# Patient Record
Sex: FEMALE | Race: BLACK OR AFRICAN AMERICAN | NOT HISPANIC OR LATINO | Employment: UNEMPLOYED | URBAN - METROPOLITAN AREA
[De-identification: names, ages, dates, MRNs, and addresses within clinical notes are randomized per-mention and may not be internally consistent; named-entity substitution may affect disease eponyms.]

---

## 2023-07-12 LAB
CHLAMYDIA TRACH., AMPLIFIED: NEGATIVE
N. GONORRHEA, AMPLIFIED: NEGATIVE
TRICHOMONAS VAGINALIS: NEGATIVE

## 2024-02-26 ENCOUNTER — HOSPITAL ENCOUNTER (OUTPATIENT)
Dept: RADIOLOGY | Facility: CLINIC | Age: 44
Discharge: HOME | End: 2024-02-26
Payer: MEDICAID

## 2024-02-26 VITALS — HEIGHT: 60 IN | WEIGHT: 184.08 LBS | BODY MASS INDEX: 36.14 KG/M2

## 2024-02-26 DIAGNOSIS — Z12.31 ENCOUNTER FOR SCREENING MAMMOGRAM FOR MALIGNANT NEOPLASM OF BREAST: ICD-10-CM

## 2024-02-26 PROCEDURE — 77067 SCR MAMMO BI INCL CAD: CPT | Mod: BILATERAL PROCEDURE | Performed by: STUDENT IN AN ORGANIZED HEALTH CARE EDUCATION/TRAINING PROGRAM

## 2024-02-26 PROCEDURE — 77063 BREAST TOMOSYNTHESIS BI: CPT | Mod: BILATERAL PROCEDURE | Performed by: STUDENT IN AN ORGANIZED HEALTH CARE EDUCATION/TRAINING PROGRAM

## 2024-02-26 PROCEDURE — 77067 SCR MAMMO BI INCL CAD: CPT

## 2024-02-28 ENCOUNTER — TELEPHONE (OUTPATIENT)
Dept: OBSTETRICS AND GYNECOLOGY | Facility: CLINIC | Age: 44
End: 2024-02-28
Payer: MEDICAID

## 2024-02-28 NOTE — TELEPHONE ENCOUNTER
Called patient to discuss mammogram results. Patient informed she needs to complete diagnostic and ultrasound testing. Patient stated she would call to schedule appointment. Patient did not express any concerns at this time. Patient informed to call office if she has any questions.    ----- Message from Kimberley Nichols MD sent at 2/27/2024  7:17 PM EST -----  Please call patient about her recent mammogram. She was due for a diagnostic mammogram and ultrasound but declines at that time and opted for a screening mammogram. Her evaluation is incomplete until she has a diagnostic mammogram with more imaging and an ultrasound. The orders from 2023 are in the system and are still good so she should call to schedule. Please let me know if she has specific concerns about having the evaluation done that I can help address. thanks

## 2024-03-08 ENCOUNTER — APPOINTMENT (OUTPATIENT)
Dept: RADIOLOGY | Facility: HOSPITAL | Age: 44
End: 2024-03-08
Payer: MEDICAID

## 2024-03-15 ENCOUNTER — HOSPITAL ENCOUNTER (OUTPATIENT)
Dept: RADIOLOGY | Facility: HOSPITAL | Age: 44
Discharge: HOME | End: 2024-03-15
Payer: MEDICAID

## 2024-03-15 DIAGNOSIS — N63.0 UNSPECIFIED LUMP IN UNSPECIFIED BREAST: ICD-10-CM

## 2024-03-15 DIAGNOSIS — R92.8 ABNORMAL SCREENING MAMMOGRAM: ICD-10-CM

## 2024-03-15 PROCEDURE — 77061 BREAST TOMOSYNTHESIS UNI: CPT | Mod: LEFT SIDE | Performed by: RADIOLOGY

## 2024-03-15 PROCEDURE — 76642 ULTRASOUND BREAST LIMITED: CPT | Mod: LEFT SIDE | Performed by: RADIOLOGY

## 2024-03-15 PROCEDURE — 77061 BREAST TOMOSYNTHESIS UNI: CPT | Mod: LT

## 2024-03-15 PROCEDURE — 76982 USE 1ST TARGET LESION: CPT | Mod: LT

## 2024-03-15 PROCEDURE — 77065 DX MAMMO INCL CAD UNI: CPT | Mod: LEFT SIDE | Performed by: RADIOLOGY

## 2024-03-15 PROCEDURE — 76642 ULTRASOUND BREAST LIMITED: CPT | Mod: LT

## 2024-04-08 ENCOUNTER — OFFICE VISIT (OUTPATIENT)
Dept: OBSTETRICS AND GYNECOLOGY | Facility: CLINIC | Age: 44
End: 2024-04-08
Payer: MEDICAID

## 2024-04-08 ENCOUNTER — LAB (OUTPATIENT)
Dept: LAB | Facility: LAB | Age: 44
End: 2024-04-08
Payer: MEDICAID

## 2024-04-08 VITALS
HEIGHT: 60 IN | SYSTOLIC BLOOD PRESSURE: 118 MMHG | BODY MASS INDEX: 35.93 KG/M2 | DIASTOLIC BLOOD PRESSURE: 76 MMHG | WEIGHT: 183 LBS

## 2024-04-08 DIAGNOSIS — A53.0 SYPHILIS, LATENT: ICD-10-CM

## 2024-04-08 DIAGNOSIS — Z12.4 CERVICAL CANCER SCREENING: ICD-10-CM

## 2024-04-08 DIAGNOSIS — N93.9 ABNORMAL UTERINE BLEEDING (AUB): ICD-10-CM

## 2024-04-08 DIAGNOSIS — Z12.4 PAP SMEAR FOR CERVICAL CANCER SCREENING: ICD-10-CM

## 2024-04-08 LAB — TREPONEMA PALLIDUM IGG+IGM AB [PRESENCE] IN SERUM OR PLASMA BY IMMUNOASSAY: REACTIVE

## 2024-04-08 PROCEDURE — 99396 PREV VISIT EST AGE 40-64: CPT | Performed by: OBSTETRICS & GYNECOLOGY

## 2024-04-08 PROCEDURE — 88142 CYTOPATH C/V THIN LAYER: CPT

## 2024-04-08 PROCEDURE — 87800 DETECT AGNT MULT DNA DIREC: CPT

## 2024-04-08 PROCEDURE — 87661 TRICHOMONAS VAGINALIS AMPLIF: CPT

## 2024-04-08 PROCEDURE — 88141 CYTOPATH C/V INTERPRET: CPT | Performed by: STUDENT IN AN ORGANIZED HEALTH CARE EDUCATION/TRAINING PROGRAM

## 2024-04-08 PROCEDURE — 86780 TREPONEMA PALLIDUM: CPT

## 2024-04-08 PROCEDURE — 87624 HPV HI-RISK TYP POOLED RSLT: CPT

## 2024-04-08 PROCEDURE — 36415 COLL VENOUS BLD VENIPUNCTURE: CPT

## 2024-04-08 PROCEDURE — 86318 IA INFECTIOUS AGENT ANTIBODY: CPT

## 2024-04-08 PROCEDURE — 87491 CHLMYD TRACH DNA AMP PROBE: CPT

## 2024-04-08 PROCEDURE — 87591 N.GONORRHOEAE DNA AMP PROB: CPT

## 2024-04-08 NOTE — PROGRESS NOTES
Assessment     PLAN  1. Pap smear for cervical cancer screening  - THINPREP PAP TEST (>30)  - HPV DNA High Risk With Genotype  - C. Trachomatis / N. Gonorrhoeae, Amplified Detection  - Trichomonas vaginalis, Nucleic Acid Detection    2. Syphilis  - treated for late ;latent syphilis with 3 doses of penicillin due to unknown timing of infection. Diagnosed in 2/2023, need 12 month titer today  - Syphilis Screen with Reflex; Future    3. Abnormal uterine bleeding (AUB)  - recommend pelvic ultrasound for persistent breakthrough bleeding with mirena IUD in place  - US PELVIS TRANSABDOMINAL WITH TRANSVAGINAL; Future    Please return for your next visit in 1 year or sooner as needed.    Kimberley Nichols MD      Subjective     Vicky Wills is a 43 y.o. female who is here for a routine exam.   PCP = Mady Jonas, LUIS FERNANDO-CNP    APE Concerns:     Gynecologic History:    Menses: irregular, heavy at times, not improved with mirena IUD  Last Pap: 2/2023: ASCUS/HR HPV+, colpo 3/2023 JEVON 1, repeat pap today  STI testing: accepts GCT from pap  Contraception: mirena IUD inserted in 12/2023  Mammogram: BIRADs 3 in 3/2024, need mammogram and final left breast ultrasound in 3/2025      PMH, PSH, FH, SH, medications and allergies reviewed and edited as needed.      Objective   /76   Ht 1.524 m (5')   Wt 83 kg (183 lb)   LMP 03/08/2024 Comment: Then heavy spptting 22-224th  BMI 35.74 kg/m²      General:   Alert and oriented, in no acute distress   Neck: Supple. No visible thyromegaly.    Breast/Axilla: Normal to palpation bilaterally without masses, skin changes, or nipple discharge.    Abdomen: Soft, non-tender, without masses or organomegaly   Vulva: Normal architecture without erythema, masses, or lesions.    Vagina: Normal mucosa without lesions, masses, or atrophy. No abnormal vaginal discharge.    Cervix: Normal without masses, lesions, or signs of cervicitis. Strings visible   Uterus: Normal mobile,  non-enlarged uterus    Adnexa: Normal without masses or lesions   Pelvic Floor No POP noted. No high tone pelvic floor   Psych Normal affect. Normal mood.

## 2024-04-09 LAB — RPR SER QL: NONREACTIVE

## 2024-04-10 ENCOUNTER — APPOINTMENT (OUTPATIENT)
Dept: RADIOLOGY | Facility: HOSPITAL | Age: 44
End: 2024-04-10
Payer: MEDICAID

## 2024-04-10 LAB
C TRACH RRNA SPEC QL NAA+PROBE: NEGATIVE
N GONORRHOEA DNA SPEC QL PROBE+SIG AMP: NEGATIVE
T PALLIDUM AB SER QL AGGL: REACTIVE
T VAGINALIS RRNA SPEC QL NAA+PROBE: NEGATIVE

## 2024-04-12 ENCOUNTER — HOSPITAL ENCOUNTER (OUTPATIENT)
Dept: RADIOLOGY | Facility: HOSPITAL | Age: 44
Discharge: HOME | End: 2024-04-12
Payer: MEDICAID

## 2024-04-12 DIAGNOSIS — N93.9 ABNORMAL UTERINE BLEEDING (AUB): ICD-10-CM

## 2024-04-12 PROCEDURE — 76856 US EXAM PELVIC COMPLETE: CPT

## 2024-04-12 PROCEDURE — 76830 TRANSVAGINAL US NON-OB: CPT | Performed by: RADIOLOGY

## 2024-04-12 PROCEDURE — 76856 US EXAM PELVIC COMPLETE: CPT | Performed by: RADIOLOGY

## 2024-04-15 ENCOUNTER — TELEPHONE (OUTPATIENT)
Dept: OPERATING ROOM | Facility: CLINIC | Age: 44
End: 2024-04-15
Payer: MEDICAID

## 2024-04-15 DIAGNOSIS — N83.9 LESION OF RIGHT OVARY: Primary | ICD-10-CM

## 2024-04-15 NOTE — TELEPHONE ENCOUNTER
Called patient to discuss test results of pelvic ultrasound showing a possible dermoid cyst of right ovary. Recommend follow up pelvic MRI. Order placed.

## 2024-04-23 LAB
CYTOLOGY CMNT CVX/VAG CYTO-IMP: NORMAL
HPV HR 12 DNA GENITAL QL NAA+PROBE: POSITIVE
HPV HR GENOTYPES PNL CVX NAA+PROBE: POSITIVE
HPV16 DNA SPEC QL NAA+PROBE: NEGATIVE
HPV18 DNA SPEC QL NAA+PROBE: NEGATIVE
LAB AP HPV GENOTYPE QUESTION: YES
LAB AP HPV HR: NORMAL
LAB AP PAP ADDITIONAL TESTS: NORMAL
LABORATORY COMMENT REPORT: NORMAL
LABORATORY COMMENT REPORT: NORMAL
LMP START DATE: NORMAL
PATH REPORT.TOTAL CANCER: NORMAL

## 2024-04-24 ENCOUNTER — TELEPHONE (OUTPATIENT)
Dept: OBSTETRICS AND GYNECOLOGY | Facility: CLINIC | Age: 44
End: 2024-04-24
Payer: MEDICAID

## 2024-04-24 NOTE — TELEPHONE ENCOUNTER
4/24/2024 Called patient. Patient informed of pap results. Reviewed colposcopy procedure w/ patient. Patient is scheduled for an appointment may 16th. Patient informed if she does start her period to call to get appointment rescheduled. Patient has no questions or concerns at this time    ----- Message from Kimberley Nichols MD sent at 4/24/2024 12:48 AM EDT -----  Please let patient know that her pap was ASCUS/high risk HPV positive so we need to get her scheduled for a colposcopy. thanks

## 2024-04-27 ENCOUNTER — HOSPITAL ENCOUNTER (OUTPATIENT)
Dept: RADIOLOGY | Facility: HOSPITAL | Age: 44
Discharge: HOME | End: 2024-04-27
Payer: MEDICAID

## 2024-04-27 DIAGNOSIS — N83.9 LESION OF RIGHT OVARY: ICD-10-CM

## 2024-04-27 PROCEDURE — 72197 MRI PELVIS W/O & W/DYE: CPT

## 2024-04-27 PROCEDURE — A9575 INJ GADOTERATE MEGLUMI 0.1ML: HCPCS | Performed by: OBSTETRICS & GYNECOLOGY

## 2024-04-27 PROCEDURE — 2550000001 HC RX 255 CONTRASTS: Performed by: OBSTETRICS & GYNECOLOGY

## 2024-04-27 PROCEDURE — 72197 MRI PELVIS W/O & W/DYE: CPT | Performed by: STUDENT IN AN ORGANIZED HEALTH CARE EDUCATION/TRAINING PROGRAM

## 2024-04-27 RX ORDER — GADOTERATE MEGLUMINE 376.9 MG/ML
17 INJECTION INTRAVENOUS
Status: COMPLETED | OUTPATIENT
Start: 2024-04-27 | End: 2024-04-27

## 2024-04-27 RX ADMIN — GADOTERATE MEGLUMINE 17 ML: 376.9 INJECTION INTRAVENOUS at 10:48

## 2024-04-30 ENCOUNTER — APPOINTMENT (OUTPATIENT)
Dept: RADIOLOGY | Facility: CLINIC | Age: 44
End: 2024-04-30
Payer: MEDICAID

## 2024-05-16 ENCOUNTER — APPOINTMENT (OUTPATIENT)
Dept: OBSTETRICS AND GYNECOLOGY | Facility: CLINIC | Age: 44
End: 2024-05-16
Payer: MEDICAID

## 2024-06-13 ENCOUNTER — APPOINTMENT (OUTPATIENT)
Dept: OBSTETRICS AND GYNECOLOGY | Facility: CLINIC | Age: 44
End: 2024-06-13
Payer: MEDICAID

## 2024-06-13 VITALS
WEIGHT: 188 LBS | DIASTOLIC BLOOD PRESSURE: 78 MMHG | BODY MASS INDEX: 36.91 KG/M2 | HEIGHT: 60 IN | SYSTOLIC BLOOD PRESSURE: 124 MMHG

## 2024-06-13 DIAGNOSIS — A53.0 SYPHILIS, LATENT: ICD-10-CM

## 2024-06-13 DIAGNOSIS — R87.610 ASCUS WITH POSITIVE HIGH RISK HPV CERVICAL: Primary | ICD-10-CM

## 2024-06-13 DIAGNOSIS — N93.9 ABNORMAL UTERINE BLEEDING (AUB): ICD-10-CM

## 2024-06-13 DIAGNOSIS — D27.0 DERMOID CYST OF RIGHT OVARY: ICD-10-CM

## 2024-06-13 DIAGNOSIS — R87.810 ASCUS WITH POSITIVE HIGH RISK HPV CERVICAL: Primary | ICD-10-CM

## 2024-06-13 PROCEDURE — 99213 OFFICE O/P EST LOW 20 MIN: CPT | Performed by: OBSTETRICS & GYNECOLOGY

## 2024-06-13 ASSESSMENT — PAIN SCALES - GENERAL: PAINLEVEL: 0-NO PAIN

## 2024-06-13 NOTE — PROGRESS NOTES
Assessment     PLAN  1. ASCUS with positive high risk HPV cervical  - colposcopy performed today with ECC and one cervical biopsy. See procedure note.  - Surgical Pathology Exam    2. Dermoid cyst of right ovary  - 5cm in size, minimal abdominal discomfort, discussed option for monitoring with imaging vs surgical removal. Reviewed R/B of each option. She desires laparoscopic right ovarian cystectomy. Case request will be placed with pre-op labs.    3. Syphilis, latent  - 12 month titers were non reactive. Needs repeat in 1 year, ~4/2025    4. Abnormal uterine bleeding (AUB)  - mirena IUD in placed. Menses are light but prolonged. Discussed alternative options including other medical management options or surgical options including hysterectomy for definitive management. She desires to monitor for now.     Kimberley Nichols MD      Subjective     Vicky Wills is a 43 y.o. female who is here for colposcopy and follow up regarding dermoid cyst of ovary  PCP = Mady Jonas, LUIS FERNANDO-CNP    Concerns:   - periods have been long, ~ 10 days, lighter than previously  - minimal abdominal pain, does feel abdominal bloating and fullness  - denies any previous abdominal surgeries      PMH, PSH, FH, SH, medications and allergies reviewed and edited as needed.      Objective   /78   Ht 1.524 m (5')   Wt 85.3 kg (188 lb)   LMP 06/02/2024 (Exact Date)   BMI 36.72 kg/m²     Patient ID: Vicky Wills is a 43 y.o. female.    Colposcopy    Date/Time: 6/14/2024 10:12 PM    Performed by: Kimberley Nichols MD  Authorized by: Kimberley Nichols MD    Procedure location: cervix    Consent:     Patient questions answered: yes      Risks and benefits of the procedure and its alternatives discussed: yes      Procedural risks discussed:  Bleeding, infection and repeat procedure    Consent obtained:  Written    Consent given by:  Patient  Indication:     Cervical indication(s): high-risk HPV positive and  ASCUS    Pre-procedure:     Prep solution(s): acetic acid    Procedure:     Colposcopy with: cervical biopsy and endocervical curettage      Biopsy taken: yes      # of biopsies:  2    Biopsy location(s): ECC and 6 o clock    Cervix visibility: fully visualized      SCJ visibility: fully visualized      Lesion visualized: fully visualized      Acetowhite lesion(s): cervix      Acetowhite lesion(s) description:  One large acetohite area on posterior cervix from 4 to 8 o clock    Cervical impression: low grade    Post-procedure:     Patient tolerance of procedure:  Patient tolerated the procedure well with no immediate complications

## 2024-06-14 RX ORDER — GABAPENTIN 600 MG/1
600 TABLET ORAL ONCE
OUTPATIENT
Start: 2024-06-14 | End: 2024-06-14

## 2024-06-14 RX ORDER — CELECOXIB 400 MG/1
400 CAPSULE ORAL ONCE
OUTPATIENT
Start: 2024-06-14 | End: 2024-06-14

## 2024-06-14 RX ORDER — ACETAMINOPHEN 325 MG/1
975 TABLET ORAL ONCE
OUTPATIENT
Start: 2024-06-14 | End: 2024-06-14

## 2024-06-18 ASSESSMENT — DERMATOLOGY QUALITY OF LIFE (QOL) ASSESSMENT
WHAT SINGLE SKIN CONDITION LISTED BELOW IS THE PATIENT ANSWERING THE QUALITY-OF-LIFE ASSESSMENT QUESTIONS ABOUT: NONE OF THE ABOVE
RATE HOW EMOTIONALLY BOTHERED YOU ARE BY YOUR SKIN PROBLEM (FOR EXAMPLE, WORRY, EMBARRASSMENT, FRUSTRATION): 6 - ALWAYS BOTHERED
RATE HOW EMOTIONALLY BOTHERED YOU ARE BY YOUR SKIN PROBLEM (FOR EXAMPLE, WORRY, EMBARRASSMENT, FRUSTRATION): 6 - ALWAYS BOTHERED
WHAT SINGLE SKIN CONDITION LISTED BELOW IS THE PATIENT ANSWERING THE QUALITY-OF-LIFE ASSESSMENT QUESTIONS ABOUT: NONE OF THE ABOVE
RATE HOW BOTHERED YOU ARE BY EFFECTS OF YOUR SKIN PROBLEMS ON YOUR ACTIVITIES (EG, GOING OUT, ACCOMPLISHING WHAT YOU WANT, WORK ACTIVITIES OR YOUR RELATIONSHIPS WITH OTHERS): 6 - ALWAYS BOTHERED
RATE HOW BOTHERED YOU ARE BY EFFECTS OF YOUR SKIN PROBLEMS ON YOUR ACTIVITIES (EG, GOING OUT, ACCOMPLISHING WHAT YOU WANT, WORK ACTIVITIES OR YOUR RELATIONSHIPS WITH OTHERS): 6 - ALWAYS BOTHERED

## 2024-06-19 PROBLEM — D27.0 DERMOID CYST OF RIGHT OVARY: Status: ACTIVE | Noted: 2024-06-13

## 2024-06-20 ENCOUNTER — APPOINTMENT (OUTPATIENT)
Dept: DERMATOLOGY | Facility: CLINIC | Age: 44
End: 2024-06-20
Payer: MEDICAID

## 2024-06-20 DIAGNOSIS — L70.0 ACNE VULGARIS: Primary | ICD-10-CM

## 2024-06-20 PROCEDURE — 99204 OFFICE O/P NEW MOD 45 MIN: CPT | Performed by: DERMATOLOGY

## 2024-06-20 PROCEDURE — 1036F TOBACCO NON-USER: CPT | Performed by: DERMATOLOGY

## 2024-06-20 RX ORDER — SPIRONOLACTONE 50 MG/1
TABLET, FILM COATED ORAL
Qty: 60 TABLET | Refills: 2 | Status: SHIPPED | OUTPATIENT
Start: 2024-06-20

## 2024-06-20 RX ORDER — TRETINOIN 0.25 MG/G
CREAM TOPICAL NIGHTLY
Qty: 45 G | Refills: 3 | Status: SHIPPED | OUTPATIENT
Start: 2024-06-20 | End: 2025-06-20

## 2024-06-20 ASSESSMENT — DERMATOLOGY PATIENT ASSESSMENT
DO YOU HAVE IRREGULAR MENSTRUAL CYCLES: NO
DO YOU USE A TANNING BED: NO
DO YOU HAVE ANY NEW OR CHANGING LESIONS: NO
HAVE YOU HAD OR DO YOU HAVE A STAPH INFECTION: NO
FOR PATIENTS COMING IN FOR A FOLLOW-UP VISIT - HAVE THERE BEEN ANY CHANGES IN YOUR HEALTH SINCE YOUR LAST VISIT: NO
ARE YOU TRYING TO GET PREGNANT: NO
ARE YOU AN ORGAN TRANSPLANT RECIPIENT: NO
WHAT TYPE OF BIRTH CONTROL: IUD
ARE YOU ON BIRTH CONTROL: YES
DO YOU USE SUNSCREEN: OCCASIONALLY

## 2024-06-20 ASSESSMENT — DERMATOLOGY QUALITY OF LIFE (QOL) ASSESSMENT
ARE THERE EXCLUSIONS OR EXCEPTIONS FOR THE QUALITY OF LIFE ASSESSMENT: NO
RATE HOW EMOTIONALLY BOTHERED YOU ARE BY YOUR SKIN PROBLEM (FOR EXAMPLE, WORRY, EMBARRASSMENT, FRUSTRATION): 2
WHAT SINGLE SKIN CONDITION LISTED BELOW IS THE PATIENT ANSWERING THE QUALITY-OF-LIFE ASSESSMENT QUESTIONS ABOUT: ACNE
RATE HOW BOTHERED YOU ARE BY SYMPTOMS OF YOUR SKIN PROBLEM (EG, ITCHING, STINGING BURNING, HURTING OR SKIN IRRITATION): 2
RATE HOW BOTHERED YOU ARE BY EFFECTS OF YOUR SKIN PROBLEMS ON YOUR ACTIVITIES (EG, GOING OUT, ACCOMPLISHING WHAT YOU WANT, WORK ACTIVITIES OR YOUR RELATIONSHIPS WITH OTHERS): 1

## 2024-06-20 ASSESSMENT — PATIENT GLOBAL ASSESSMENT (PGA): PATIENT GLOBAL ASSESSMENT: PATIENT GLOBAL ASSESSMENT:  2 - MILD

## 2024-06-20 ASSESSMENT — ITCH NUMERIC RATING SCALE: HOW SEVERE IS YOUR ITCHING?: 0

## 2024-06-20 NOTE — PROGRESS NOTES
Subjective     Vicky Wills is a 43 y.o. female who presents for the following: Acne (Pt here for acne breakouts on face, chest, and back. No current treatment. Worse with menses. Pt has Mirena IUD. ).     Review of Systems:  No other skin or systemic complaints other than what is documented elsewhere in the note.    The following portions of the chart were reviewed this encounter and updated as appropriate:          Skin Cancer History  No skin cancer on file.      Specialty Problems    None       Objective   Well appearing patient in no apparent distress; mood and affect are within normal limits.    A focused skin examination was performed of the face, neck. All findings within normal limits unless otherwise noted below.    Assessment/Plan   1. Acne vulgaris  Chest - Medial (Center), Head - Anterior (Face), Left Upper Back, Right Upper Back  Comedonal papule on the forehead  Cheeks, chin erythematous papules and pustules    The chronic and intermittently flaring nature of acne was reviewed with the patient today. Patient advised that acne is multifactorial. Treatment options were reviewed with the patient. Advised that typically takes 2-3 months to see 60-80% improvement and treatments may worsen acne appearance prior to improvement.     Wash face twice daily - cerave or cetaphil gentle cleanser to wash the face with   Do not spot treat, treat the entire surface area to treat current breakouts and prevent new breakouts    Treatment recommendations:  -Start spironolactone - 50mg - Take 1 tablet (50mg) by mouth once daily. After 2 weeks, try to increase to 2 tablets (100 mg) by mouth once daily.   - Start tretinoin 0.025% cream - pea sized amount every other day, increase to daily as tolerated. Side effects of topical retinoids reviewed including increased photosensitivity, dryness, irritation and redness. To help alleviate side effects patient advised to apply initially every 2-3 nights and increase to  daily as tolerated or apply topical non-comedogenic moisturizer prior to application.    Side effects of spironolactone were reviewed including increased potassium levels, gynecomastia, breast tenderness, abnormalities in menstrual cycle, birth defects In mice, studies have seen an increased risk of kidney cancer.  Studies have shown there is minimal risk with use in patients with a history of hormonally driving malignancies, such as malignancies of the breast.    Side effects of topical retinoids reviewed including increased photosensitivity, dryness, irritation and redness. To help alleviate side effects patient advised to apply initially every 2-3 nights and increase to daily as tolerated or apply topical non-comedogenic moisturizer prior to application.        Related Procedures  Follow Up In Dermatology - Established Patient    Related Medications  spironolactone (Aldactone) 50 mg tablet  Take 1 tablet (50mg) by mouth once daily. After 2 weeks, try to increase to 2 tablets (100 mg) by mouth once daily.    tretinoin (Retin-A) 0.025 % cream  Apply topically once daily at bedtime. A pea-sized amount to the whole face, start every 2-3 nights and gradually increase to nightly

## 2024-06-20 NOTE — PATIENT INSTRUCTIONS
Acne is multifactorial and  typically takes 2-3 months to see 60-80% improvement and treatments may worsen acne appearance prior to improvement.     Wash face twice daily - cerave or cetaphil gentle cleanser to wash the face with   Do not spot treat, treat the entire surface area to treat current breakouts and prevent new breakouts    Treatment recommendations:  -Start spironolactone - 50mg - Take 1 tablet (50mg) by mouth once daily. After 2 weeks, try to increase to 2 tablets (100 mg) by mouth once daily.   - Start tretinoin 0.025% cream - pea sized amount every other day, increase to daily as tolerated. Side effects of topical retinoids reviewed including increased photosensitivity, dryness, irritation and redness. To help alleviate side effects patient advised to apply initially every 2-3 nights and increase to daily as tolerated or apply topical non-comedogenic moisturizer prior to application.

## 2024-06-24 ENCOUNTER — TELEPHONE (OUTPATIENT)
Dept: DERMATOLOGY | Facility: CLINIC | Age: 44
End: 2024-06-24
Payer: MEDICAID

## 2024-07-01 LAB
LAB AP ASR DISCLAIMER: NORMAL
LABORATORY COMMENT REPORT: NORMAL
PATH REPORT.FINAL DX SPEC: NORMAL
PATH REPORT.GROSS SPEC: NORMAL
PATH REPORT.RELEVANT HX SPEC: NORMAL
PATH REPORT.TOTAL CANCER: NORMAL

## 2024-07-01 PROCEDURE — 88342 IMHCHEM/IMCYTCHM 1ST ANTB: CPT

## 2024-07-03 ENCOUNTER — TELEPHONE (OUTPATIENT)
Dept: OBSTETRICS AND GYNECOLOGY | Facility: CLINIC | Age: 44
End: 2024-07-03
Payer: MEDICAID

## 2024-07-03 NOTE — TELEPHONE ENCOUNTER
Called patient about results from colposcopy showing JEVON 2-3 on ECC. Recommend a LEEP procedure and she is agreeable. [place] [facility] [place] so will add this onto the case.

## 2024-08-01 ENCOUNTER — TELEPHONE (OUTPATIENT)
Dept: OBSTETRICS AND GYNECOLOGY | Facility: CLINIC | Age: 44
End: 2024-08-01
Payer: MEDICAID

## 2024-08-02 ENCOUNTER — TELEPHONE (OUTPATIENT)
Dept: OBSTETRICS AND GYNECOLOGY | Facility: CLINIC | Age: 44
End: 2024-08-02
Payer: MEDICAID

## 2024-08-02 NOTE — TELEPHONE ENCOUNTER
Called patient. Patient stated she had sharp pain constant pain yesterday in her abdomen. It worsened if she coughed or sneezed. Patient stated the pain went away. She does not have that sharp pain today. Patient denies any other symptom. Patient told to continue to monitor symptoms and give the office a call if symptoms return.

## 2024-08-08 NOTE — HOSPITAL COURSE
Vicky Wills is a 43 y.o. female presenting for laparoscopic ovarian cystectomy w/ LEEP in the setting of dermoid cyst of right ovarian and coloposcopy biopsy positive for HSIL, JEVON 2-3.    Preop labs (8/12): Hgb 12.5, Plt 295    Imaging/pathology:    6/13/24:  Colposcopy showing HSIL/JEVON 2-3 on ECC    04/2024 MRI  IMPRESSION:  1. Predominantly fat containing right adnexal lesion measuring 5.1 cm  with no suspicious postcontrast enhancement likely representing  ovarian dermoid/mature cystic teratoma correlating with the recent  ultrasound finding. This could be followed up as clinically indicated  by ultrasound  2. Multiple uterine fibroids noted.  3. Multiple partially complex nabothian cysts noted.    Past Medical History:  Past Medical History:   Diagnosis Date    Acute candidiasis of vulva and vagina 08/02/2018    Yeast vaginitis    Acute vaginitis 02/10/2016    Acute vaginitis    Acute vaginitis 01/21/2016    Bacterial vaginitis    Encounter for screening for infections with a predominantly sexual mode of transmission 04/26/2016    Screen for STD (sexually transmitted disease)    Encounter for screening for malignant neoplasm of vagina     Vaginal Pap smear    Functional dyspepsia     Indigestion    Other fatigue     Tired    Other general symptoms and signs     Change in weight    Other problems related to lifestyle 02/10/2016    Other problems related to lifestyle    Personal history of diseases of the skin and subcutaneous tissue 07/09/2014    History of dermatitis    Personal history of other (healed) physical injury and trauma     History of contusion    Personal history of other diseases of the digestive system     History of constipation    Personal history of other diseases of the female genital tract 02/10/2016    History of vaginal discharge    Personal history of other diseases of the female genital tract 12/14/2013    History of vaginitis    Personal history of other diseases of the female  genital tract 2014    History of vaginal discharge    Personal history of other specified conditions     History of diarrhea    Personal history of other specified conditions     History of heartburn    Personal history of other specified conditions 2016    History of urinary frequency    Postcoital and contact bleeding 04/10/2017    PCB (post coital bleeding)    Unspecified abnormal findings in urine 2016    Abnormal urine findings    Unspecified disorder of nose and nasal sinuses     Sinus problem        Surgical History:  No past surgical history on file.     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 Needs repeat in 1 year, ~2025     OB History    Para Term  AB Living   9 5 5   4 4   SAB IAB Ectopic Multiple Live Births   3 1     4      # Outcome Date GA Lbr Gordon/2nd Weight Sex Type Anes PTL Lv   9 Term      Vag-Spont   FD   8 SAB            7 SAB            6 SAB            5 IAB            4 Term      Vag-Spont   MOHAN   3 Term      Vag-Spont   MOHAN   2 Term      Vag-Spont   MOHAN   1 Term      Vag-Spont   MOHAN      Social History:  Social History     Socioeconomic History    Marital status: Single   Tobacco Use    Smoking status: Never    Smokeless tobacco: Never        Family History:  No family history on file.     Medications:  Spironolactone, tretinoin 0.025% cream    Allergies:  Patient has no known allergies.

## 2024-08-12 ENCOUNTER — LAB (OUTPATIENT)
Dept: LAB | Facility: LAB | Age: 44
End: 2024-08-12
Payer: MEDICAID

## 2024-08-12 DIAGNOSIS — D27.0 DERMOID CYST OF RIGHT OVARY: ICD-10-CM

## 2024-08-12 LAB
ABO GROUP (TYPE) IN BLOOD: NORMAL
ANTIBODY SCREEN: NORMAL
ERYTHROCYTE [DISTWIDTH] IN BLOOD BY AUTOMATED COUNT: 12.6 % (ref 11.5–14.5)
HCG UR QL IA.RAPID: NEGATIVE
HCT VFR BLD AUTO: 38.9 % (ref 36–46)
HGB BLD-MCNC: 12.5 G/DL (ref 12–16)
MCH RBC QN AUTO: 29.3 PG (ref 26–34)
MCHC RBC AUTO-ENTMCNC: 32.1 G/DL (ref 32–36)
MCV RBC AUTO: 91 FL (ref 80–100)
NRBC BLD-RTO: 0 /100 WBCS (ref 0–0)
PLATELET # BLD AUTO: 295 X10*3/UL (ref 150–450)
RBC # BLD AUTO: 4.26 X10*6/UL (ref 4–5.2)
RH FACTOR (ANTIGEN D): NORMAL
WBC # BLD AUTO: 6.5 X10*3/UL (ref 4.4–11.3)

## 2024-08-12 PROCEDURE — 86900 BLOOD TYPING SEROLOGIC ABO: CPT

## 2024-08-12 PROCEDURE — 36415 COLL VENOUS BLD VENIPUNCTURE: CPT

## 2024-08-12 PROCEDURE — 85027 COMPLETE CBC AUTOMATED: CPT

## 2024-08-12 PROCEDURE — 86901 BLOOD TYPING SEROLOGIC RH(D): CPT

## 2024-08-12 PROCEDURE — 81025 URINE PREGNANCY TEST: CPT

## 2024-08-12 PROCEDURE — 86850 RBC ANTIBODY SCREEN: CPT

## 2024-08-14 ENCOUNTER — ANESTHESIA EVENT (OUTPATIENT)
Dept: OPERATING ROOM | Facility: HOSPITAL | Age: 44
End: 2024-08-14
Payer: MEDICAID

## 2024-08-15 ENCOUNTER — HOSPITAL ENCOUNTER (OUTPATIENT)
Facility: HOSPITAL | Age: 44
Setting detail: OUTPATIENT SURGERY
Discharge: HOME | End: 2024-08-15
Attending: OBSTETRICS & GYNECOLOGY | Admitting: OBSTETRICS & GYNECOLOGY
Payer: MEDICAID

## 2024-08-15 ENCOUNTER — ANESTHESIA (OUTPATIENT)
Dept: OPERATING ROOM | Facility: HOSPITAL | Age: 44
End: 2024-08-15
Payer: MEDICAID

## 2024-08-15 VITALS
OXYGEN SATURATION: 100 % | RESPIRATION RATE: 12 BRPM | TEMPERATURE: 97.9 F | HEIGHT: 64 IN | DIASTOLIC BLOOD PRESSURE: 81 MMHG | BODY MASS INDEX: 31.88 KG/M2 | HEART RATE: 66 BPM | WEIGHT: 186.73 LBS | SYSTOLIC BLOOD PRESSURE: 130 MMHG

## 2024-08-15 DIAGNOSIS — Z98.890 S/P LAPAROSCOPY: Primary | ICD-10-CM

## 2024-08-15 DIAGNOSIS — D27.0 DERMOID CYST OF RIGHT OVARY: ICD-10-CM

## 2024-08-15 LAB
ABO GROUP (TYPE) IN BLOOD: NORMAL
PREGNANCY TEST URINE, POC: NEGATIVE
RH FACTOR (ANTIGEN D): NORMAL

## 2024-08-15 PROCEDURE — 36415 COLL VENOUS BLD VENIPUNCTURE: CPT | Performed by: OBSTETRICS & GYNECOLOGY

## 2024-08-15 PROCEDURE — 7100000010 HC PHASE TWO TIME - EACH INCREMENTAL 1 MINUTE: Performed by: OBSTETRICS & GYNECOLOGY

## 2024-08-15 PROCEDURE — 3700000001 HC GENERAL ANESTHESIA TIME - INITIAL BASE CHARGE: Performed by: OBSTETRICS & GYNECOLOGY

## 2024-08-15 PROCEDURE — 88342 IMHCHEM/IMCYTCHM 1ST ANTB: CPT | Performed by: PATHOLOGY

## 2024-08-15 PROCEDURE — 2500000005 HC RX 250 GENERAL PHARMACY W/O HCPCS: Mod: SE | Performed by: OBSTETRICS & GYNECOLOGY

## 2024-08-15 PROCEDURE — 3600000003 HC OR TIME - INITIAL BASE CHARGE - PROCEDURE LEVEL THREE: Performed by: OBSTETRICS & GYNECOLOGY

## 2024-08-15 PROCEDURE — 57460 BX OF CERVIX W/SCOPE LEEP: CPT | Performed by: OBSTETRICS & GYNECOLOGY

## 2024-08-15 PROCEDURE — 2500000004 HC RX 250 GENERAL PHARMACY W/ HCPCS (ALT 636 FOR OP/ED): Mod: SE

## 2024-08-15 PROCEDURE — 3700000002 HC GENERAL ANESTHESIA TIME - EACH INCREMENTAL 1 MINUTE: Performed by: OBSTETRICS & GYNECOLOGY

## 2024-08-15 PROCEDURE — 88307 TISSUE EXAM BY PATHOLOGIST: CPT | Performed by: PATHOLOGY

## 2024-08-15 PROCEDURE — 2500000004 HC RX 250 GENERAL PHARMACY W/ HCPCS (ALT 636 FOR OP/ED): Mod: SE | Performed by: OBSTETRICS & GYNECOLOGY

## 2024-08-15 PROCEDURE — 7100000001 HC RECOVERY ROOM TIME - INITIAL BASE CHARGE: Performed by: OBSTETRICS & GYNECOLOGY

## 2024-08-15 PROCEDURE — 3600000008 HC OR TIME - EACH INCREMENTAL 1 MINUTE - PROCEDURE LEVEL THREE: Performed by: OBSTETRICS & GYNECOLOGY

## 2024-08-15 PROCEDURE — 58662 LAPAROSCOPY EXCISE LESIONS: CPT | Performed by: OBSTETRICS & GYNECOLOGY

## 2024-08-15 PROCEDURE — 7100000002 HC RECOVERY ROOM TIME - EACH INCREMENTAL 1 MINUTE: Performed by: OBSTETRICS & GYNECOLOGY

## 2024-08-15 PROCEDURE — 81025 URINE PREGNANCY TEST: CPT | Performed by: OBSTETRICS & GYNECOLOGY

## 2024-08-15 PROCEDURE — 2720000007 HC OR 272 NO HCPCS: Performed by: OBSTETRICS & GYNECOLOGY

## 2024-08-15 PROCEDURE — 2500000001 HC RX 250 WO HCPCS SELF ADMINISTERED DRUGS (ALT 637 FOR MEDICARE OP): Mod: SE | Performed by: OBSTETRICS & GYNECOLOGY

## 2024-08-15 PROCEDURE — 2500000005 HC RX 250 GENERAL PHARMACY W/O HCPCS: Mod: SE

## 2024-08-15 PROCEDURE — 2500000001 HC RX 250 WO HCPCS SELF ADMINISTERED DRUGS (ALT 637 FOR MEDICARE OP): Mod: SE

## 2024-08-15 PROCEDURE — 88307 TISSUE EXAM BY PATHOLOGIST: CPT | Mod: TC,SUR,WESLAB | Performed by: OBSTETRICS & GYNECOLOGY

## 2024-08-15 PROCEDURE — 7100000009 HC PHASE TWO TIME - INITIAL BASE CHARGE: Performed by: OBSTETRICS & GYNECOLOGY

## 2024-08-15 RX ORDER — SODIUM CHLORIDE, SODIUM LACTATE, POTASSIUM CHLORIDE, CALCIUM CHLORIDE 600; 310; 30; 20 MG/100ML; MG/100ML; MG/100ML; MG/100ML
100 INJECTION, SOLUTION INTRAVENOUS CONTINUOUS
Status: DISCONTINUED | OUTPATIENT
Start: 2024-08-15 | End: 2024-08-15 | Stop reason: HOSPADM

## 2024-08-15 RX ORDER — ACETAMINOPHEN 325 MG/1
975 TABLET ORAL ONCE
Status: COMPLETED | OUTPATIENT
Start: 2024-08-15 | End: 2024-08-15

## 2024-08-15 RX ORDER — ROCURONIUM BROMIDE 10 MG/ML
INJECTION, SOLUTION INTRAVENOUS AS NEEDED
Status: DISCONTINUED | OUTPATIENT
Start: 2024-08-15 | End: 2024-08-15

## 2024-08-15 RX ORDER — POLYETHYLENE GLYCOL 3350 17 G/17G
17 POWDER, FOR SOLUTION ORAL DAILY
Qty: 510 G | Refills: 0 | Status: SHIPPED | OUTPATIENT
Start: 2024-08-15 | End: 2024-09-14

## 2024-08-15 RX ORDER — CELECOXIB 200 MG/1
400 CAPSULE ORAL ONCE
Status: COMPLETED | OUTPATIENT
Start: 2024-08-15 | End: 2024-08-15

## 2024-08-15 RX ORDER — OXYCODONE HYDROCHLORIDE 5 MG/1
5 TABLET ORAL EVERY 4 HOURS PRN
Qty: 5 TABLET | Refills: 0 | Status: SHIPPED | OUTPATIENT
Start: 2024-08-15

## 2024-08-15 RX ORDER — HYDROMORPHONE HYDROCHLORIDE 1 MG/ML
0.2 INJECTION, SOLUTION INTRAMUSCULAR; INTRAVENOUS; SUBCUTANEOUS EVERY 5 MIN PRN
Status: DISCONTINUED | OUTPATIENT
Start: 2024-08-15 | End: 2024-08-15 | Stop reason: HOSPADM

## 2024-08-15 RX ORDER — ONDANSETRON HYDROCHLORIDE 2 MG/ML
INJECTION, SOLUTION INTRAVENOUS AS NEEDED
Status: DISCONTINUED | OUTPATIENT
Start: 2024-08-15 | End: 2024-08-15

## 2024-08-15 RX ORDER — ACETIC ACID 5 %
LIQUID (ML) MISCELLANEOUS CONTINUOUS PRN
Status: COMPLETED | OUTPATIENT
Start: 2024-08-15 | End: 2024-08-15

## 2024-08-15 RX ORDER — HYDROMORPHONE HYDROCHLORIDE 1 MG/ML
INJECTION, SOLUTION INTRAMUSCULAR; INTRAVENOUS; SUBCUTANEOUS AS NEEDED
Status: DISCONTINUED | OUTPATIENT
Start: 2024-08-15 | End: 2024-08-15

## 2024-08-15 RX ORDER — GABAPENTIN 600 MG/1
600 TABLET ORAL ONCE
Status: COMPLETED | OUTPATIENT
Start: 2024-08-15 | End: 2024-08-15

## 2024-08-15 RX ORDER — OXYCODONE HYDROCHLORIDE 5 MG/1
5 TABLET ORAL EVERY 4 HOURS PRN
Status: DISCONTINUED | OUTPATIENT
Start: 2024-08-15 | End: 2024-08-15 | Stop reason: HOSPADM

## 2024-08-15 RX ORDER — MIDAZOLAM HYDROCHLORIDE 1 MG/ML
INJECTION INTRAMUSCULAR; INTRAVENOUS AS NEEDED
Status: DISCONTINUED | OUTPATIENT
Start: 2024-08-15 | End: 2024-08-15

## 2024-08-15 RX ORDER — LIDOCAINE HYDROCHLORIDE AND EPINEPHRINE 10; 10 MG/ML; UG/ML
INJECTION, SOLUTION INFILTRATION; PERINEURAL AS NEEDED
Status: DISCONTINUED | OUTPATIENT
Start: 2024-08-15 | End: 2024-08-15 | Stop reason: HOSPADM

## 2024-08-15 RX ORDER — HYDROMORPHONE HYDROCHLORIDE 1 MG/ML
0.5 INJECTION, SOLUTION INTRAMUSCULAR; INTRAVENOUS; SUBCUTANEOUS EVERY 5 MIN PRN
Status: DISCONTINUED | OUTPATIENT
Start: 2024-08-15 | End: 2024-08-15 | Stop reason: HOSPADM

## 2024-08-15 RX ORDER — SODIUM CHLORIDE 0.9 G/100ML
IRRIGANT IRRIGATION AS NEEDED
Status: DISCONTINUED | OUTPATIENT
Start: 2024-08-15 | End: 2024-08-15 | Stop reason: HOSPADM

## 2024-08-15 RX ORDER — ONDANSETRON HYDROCHLORIDE 2 MG/ML
4 INJECTION, SOLUTION INTRAVENOUS ONCE AS NEEDED
Status: DISCONTINUED | OUTPATIENT
Start: 2024-08-15 | End: 2024-08-15 | Stop reason: HOSPADM

## 2024-08-15 RX ORDER — WATER 1 ML/ML
IRRIGANT IRRIGATION AS NEEDED
Status: DISCONTINUED | OUTPATIENT
Start: 2024-08-15 | End: 2024-08-15 | Stop reason: HOSPADM

## 2024-08-15 RX ORDER — IBUPROFEN 600 MG/1
600 TABLET ORAL EVERY 6 HOURS SCHEDULED
Qty: 56 TABLET | Refills: 0 | Status: SHIPPED | OUTPATIENT
Start: 2024-08-15 | End: 2024-08-29

## 2024-08-15 RX ORDER — ACETAMINOPHEN 500 MG
1000 TABLET ORAL EVERY 6 HOURS PRN
Qty: 60 TABLET | Refills: 1 | Status: SHIPPED | OUTPATIENT
Start: 2024-08-15

## 2024-08-15 RX ORDER — OXYCODONE HYDROCHLORIDE 5 MG/1
10 TABLET ORAL EVERY 4 HOURS PRN
Status: DISCONTINUED | OUTPATIENT
Start: 2024-08-15 | End: 2024-08-15 | Stop reason: HOSPADM

## 2024-08-15 RX ORDER — LIDOCAINE HYDROCHLORIDE 20 MG/ML
INJECTION, SOLUTION INFILTRATION; PERINEURAL AS NEEDED
Status: DISCONTINUED | OUTPATIENT
Start: 2024-08-15 | End: 2024-08-15

## 2024-08-15 RX ORDER — BUPIVACAINE HYDROCHLORIDE 5 MG/ML
INJECTION, SOLUTION PERINEURAL AS NEEDED
Status: DISCONTINUED | OUTPATIENT
Start: 2024-08-15 | End: 2024-08-15 | Stop reason: HOSPADM

## 2024-08-15 RX ORDER — FENTANYL CITRATE 50 UG/ML
INJECTION, SOLUTION INTRAMUSCULAR; INTRAVENOUS AS NEEDED
Status: DISCONTINUED | OUTPATIENT
Start: 2024-08-15 | End: 2024-08-15

## 2024-08-15 RX ORDER — LIDOCAINE HYDROCHLORIDE 10 MG/ML
0.1 INJECTION, SOLUTION EPIDURAL; INFILTRATION; INTRACAUDAL; PERINEURAL ONCE
Status: DISCONTINUED | OUTPATIENT
Start: 2024-08-15 | End: 2024-08-15 | Stop reason: HOSPADM

## 2024-08-15 RX ORDER — PROPOFOL 10 MG/ML
INJECTION, EMULSION INTRAVENOUS AS NEEDED
Status: DISCONTINUED | OUTPATIENT
Start: 2024-08-15 | End: 2024-08-15

## 2024-08-15 RX ADMIN — OXYCODONE HYDROCHLORIDE 5 MG: 5 TABLET ORAL at 10:14

## 2024-08-15 RX ADMIN — GABAPENTIN 600 MG: 600 TABLET, FILM COATED ORAL at 06:54

## 2024-08-15 RX ADMIN — ACETAMINOPHEN 975 MG: 325 TABLET ORAL at 06:54

## 2024-08-15 RX ADMIN — CELECOXIB 400 MG: 200 CAPSULE ORAL at 06:54

## 2024-08-15 SDOH — HEALTH STABILITY: MENTAL HEALTH: CURRENT SMOKER: 1

## 2024-08-15 ASSESSMENT — PAIN - FUNCTIONAL ASSESSMENT
PAIN_FUNCTIONAL_ASSESSMENT: 0-10

## 2024-08-15 ASSESSMENT — COLUMBIA-SUICIDE SEVERITY RATING SCALE - C-SSRS
1. IN THE PAST MONTH, HAVE YOU WISHED YOU WERE DEAD OR WISHED YOU COULD GO TO SLEEP AND NOT WAKE UP?: NO
6. HAVE YOU EVER DONE ANYTHING, STARTED TO DO ANYTHING, OR PREPARED TO DO ANYTHING TO END YOUR LIFE?: NO
2. HAVE YOU ACTUALLY HAD ANY THOUGHTS OF KILLING YOURSELF?: NO

## 2024-08-15 ASSESSMENT — ENCOUNTER SYMPTOMS
RESPIRATORY NEGATIVE: 1
GASTROINTESTINAL NEGATIVE: 1
CARDIOVASCULAR NEGATIVE: 1
CONSTITUTIONAL NEGATIVE: 1

## 2024-08-15 ASSESSMENT — PAIN SCALES - GENERAL
PAINLEVEL_OUTOF10: 4
PAINLEVEL_OUTOF10: 0 - NO PAIN
PAINLEVEL_OUTOF10: 0 - NO PAIN
PAINLEVEL_OUTOF10: 6
PAINLEVEL_OUTOF10: 3
PAINLEVEL_OUTOF10: 6
PAINLEVEL_OUTOF10: 4
PAINLEVEL_OUTOF10: 0 - NO PAIN

## 2024-08-15 NOTE — OP NOTE
Date: 8/15/2024  [facility]    Name: Vicky Wills, : 1980, Age: 43 y.o., MRN: [id number], Sex: female    Diagnosis  Pre-op Diagnosis      * Dermoid cyst of right ovary [D27.0] Post-op Diagnosis     * Dermoid cyst of right ovary [D27.0]     Procedures  Excision Cyst Laparoscopy Ovary  88169 - IA LAPS FULG/EXC OVARY VISCERA/PERITONEAL SURFACE    COLPOSCOPY, WITH LEEP OF CERVIX  68840 - IA COLPOSCOPY CERVIX VAG LOOP ELTRD BX CERVIX      Surgeons      * Kimberley Nichols - Primary    Resident/Fellow/Other Assistant:  Surgeons and Role:     * Deisi Gay MD - Resident - Assisting    Procedure Summary  Anesthesia: General  ASA: II  Anesthesia Staff: Anesthesiologist: Marisela Lund MD  Anesthesia Resident: Hong Concepcion MD  Estimated Blood Loss: 10 mL  Intra-op Medications:   Administrations occurring from 0715 to 0945 on 08/15/24:   Medication Name Total Dose   sodium chloride 0.9 % irrigation solution 500 mL   acetic acid (bulk) external solution 15 mL   ferric subsulfate (Astringyn) solution 1 Application   sterile water irrigation solution 1,000 mL   lidocaine-epinephrine (Xylocaine W/EPI) 1 %-1:100,000 injection 10 mL              Anesthesia Record               Intraprocedure I/O Totals          Output    Urine 35 mL    Total Output 35 mL          Specimen:   ID Type Source Tests Collected by Time   1 : LEEP Tissue CERVIX LEEP SURGICAL PATHOLOGY EXAM Kimberley Nichols MD 8/15/2024 0808   2 : RIGHT OVARIAN CYST Tissue CYST OVARY SURGICAL PATHOLOGY EXAM Kimberley Nichols MD 8/15/2024 0901        Staff:   Circulator: Jennifer  Scrub Person: Samantha  Circulator: Joleen  Scrub Person: Gertrude       Indication:  colposcopy biopsy positive for HSIL, JEVON 2-3 and presumed dermoid cyst of right ovarian and     Findings:   Normal external genitalia, see colposcopy findings below, ~5cm right ovarian cyst resembling a benign teratoma, ~3 cm hemorrhagic right ovarian cyst, normal  appearing left ovary, normal appearing uterus with 3cm fibroid near the let cornua.     Procedure Details:  The patient was taken to the operating room where general anesthesia was found be adequate.     Attention was first turned to the colposcopy and LEEP.  She was then positioned in the dorsal lithotomy position. Next, a coated speculum was placed into the vagina. The anterior lip of the cervix was grasped with a coated single-tooth tenaculum and it was brought into view. Using the colposcope, there were no gross cervical abnormalities. Adequate colposcopy performed after application of dilute acetic acid solution to the cervix. There were some acetowhite changes noted at 12 and 6 oclock. Lugols solution was applied to the cervix and had even diffuse uptake.    The LEEP was performed with the 12 mm loop with attempt to deviate the IUD strings as not to cut them. The specimen appeared adequate and was removed in two pieces. IUD strings remained uncut. The specimen was tagged at 6 o'clock with a stitch. The LEEP base was coagulated with the Bovie roller ball.Monsel's solution was applied. Good hemostasis was noted. The speculum and tenaculum was removed.    Next attention was turned to the cystectomy portion of the procedure.    She was prepped and draped in a sterile fashion after placement in the dorsal lithotomy position. A Cosme catheter was inserted into the patient's bladder.    A 10 mm vertical skin incision was made in the umbilicus. The underlying layer of fascia was grasped with Kocher clamps and incised, and the underlying layer of peritoneum was then entered bluntly. A 10mm trocar was then inserted. Pneumoperitoneum was achieved. The laparoscope was then inserted. There was noted to be no injuries upon entry. A survey of the upper abdomen was performed which noted normal anatomy. The patient was then placed in the steep Trendelenburg. 5mm trocars were placed in the right and left lower quadrants under  direct visualization.    The presumed dermoid cyst was identified on the right ovary. Normal ovarian tissue was also identified however there was only a small area of normal ovarian tissue near the IP. The cyst was nearly penduculated on top of the ovary, so the decision was made to remove it en bloc. The Ligasure was used to come across the stalk of the cyst until it was completely separate from the ovary.     Next the same steps were repeated to remove the smaller hemorrhagic cyst. There was no intra-abdominal rupture of either cyst.    The 10 scope was switched for a 5 scope and a 10mm endocatch bag was inserted into the abdomen through the umbilical port. Both cysts were placed into the bag which was closed and brought to the umbilical port. The cysts were ruptured in the bag outside of the abdomen to aid removal. The larger presumed dermoid cyst was filled with sebaceous fluid and hair. The smaller cyst appeared to contain old blood consisted with an endometrioma vs hemorrhagic cyst.    Following this, the abdomen was copiously irrigated and cleared of any clot and debris. It was found to be hemostatic. The laparoscopic ports were removed. The fascia was closed at the umbilicus with 0-Vicryl and the skin incisions closed with subcutaneous sutures of 4-0 monocryl. The blanco catheter was removed. Sponge, lap, and needle counts were correct x2. The patient was taken back to the PACU in stable condition following reversal of anesthesia. Dr. Nichols  was present for all key portions of the procedure.

## 2024-08-15 NOTE — ANESTHESIA PREPROCEDURE EVALUATION
Patient: Vicky Wills    Procedure Information       Date/Time: 08/15/24 0715    Procedures:       Excision Cyst Laparoscopy Ovary (Right)      COLPOSCOPY, WITH LEEP OF CERVIX    Location: [facility] OR 02 / [facility] OR    Surgeons: Kimberley Nichols MD            Relevant Problems   No relevant active problems       Clinical information reviewed:   Tobacco  Allergies    Med Hx  Surg Hx  OB Status  Fam Hx  Soc Hx        NPO Detail:  NPO/Void Status  Carbohydrate Drink Given Prior to Surgery? : N  Date of Last Liquid: 08/14/24  Time of Last Liquid: 2000  Date of Last Solid: 08/14/24  Time of Last Solid: 2000  Last Intake Type: Clear fluids  Time of Last Void: 0643         Physical Exam    Airway  Mallampati: II  TM distance: >3 FB  Neck ROM: full  Comments: Can bite upper lip   Cardiovascular - normal exam     Dental - normal exam     Pulmonary - normal exam     Abdominal - normal exam           Anesthesia Plan    History of general anesthesia?: yes  History of complications of general anesthesia?: no    ASA 2     general     The patient is a current smoker.  Patient was not previously instructed to abstain from smoking on day of procedure.    intravenous induction   Trial extubation is planned.  Anesthetic plan and risks discussed with patient.  Use of blood products discussed with patient who consented to blood products.    Plan discussed with resident and attending.

## 2024-08-15 NOTE — ANESTHESIA PROCEDURE NOTES
Peripheral IV  Date/Time: 8/15/2024 7:59 AM      Placement  Needle size: 20 G  Laterality: right  Location: hand  Site prep: chlorhexidine  Technique: anatomical landmarks  Attempts: 3

## 2024-08-15 NOTE — DISCHARGE INSTRUCTIONS
Discharge Instructions    Medications and Pain Management  Common areas of pain after laparoscopic hysterectomy include the incision pain, pain in between your shoulder blades, the pelvis and lower back. The gas that was used to distend your abdomen for the surgery is absorbed slowly into your blood stream over the first 3-4 days after surgery. It is not passed intestinally, although, because your abdomen is distended, it may feel similar to intestinal gas. Staying active and walking is the best way to promote the absorption of this gas.  Immediately after surgery, nerve pain is the most intense, typically for the first 6 to 12 hours. As the body heals, it creates inflammation around the incisions sites adding pressure and creating soreness. After 5 days, the inflammation begins to recede and significant improvement in soreness is expected. Pulling on the incisions, especially if sudden, such as when you cough, will reactivate the nerve pain. Support your abdomen with a pillow during coughing or sneezing as this will be helpful to minimize pain. There are two types of pain pills typically used for post-operative pain management, narcotics such as tramadol and an anti-inflammatory such as Ibuprofen or Naprosyn.  Taking regular anti-inflammatory pills, such as 600mg of Ibuprofen every 6 hours for the first 5 days and then as needed is recommended. You can alternate ibuprofen with tylenol (975mg or 1000mg). The tylenol can be taken every 6 hours.  If you have problems using NSAIDs, be sure to discuss this with your doctor. The narcotic can be used on a schedule for the first 1 to 2 days but after that, only as you need it. Narcotics can cause constipation, nausea, sleepiness and headaches. You may begin your usual home medications as you were taking before unless directed by your doctor.    Incisional care  Paper tape steri-strips are typically used for the abdominal incisions. The steri-strips will fall off on  their own or can be removed at your first post-operative appointment. You may shower and use a mild soap around the incisions and pat dry. Do not use a washcloth or scrub the incisions. Using peroxide or antiseptics is not recommended for routine care. Avoid hot and steamy showers as this may cause you to feel faint. No tub baths for six weeks following surgery. There may be discoloration or bruising around the incisions. This is normal and may take several weeks to resolve. Firmness or a nodular area under the skin near the incision may represent a collection of blood, this too will resolve on its own after a little time. If any incision develops tenderness, redness in the skin layer or has drainage please call the office.    Vaginal Discharge  You may have a mildly malodorous discharge that can be look like coffee ground color and occasional spotting for up to 6 weeks. Do not put anything in the vagina like tampons or have sexual intercourse for 6 weeks after surgery. If you are having bleeding like a period, that is abnormal and you should contact your doctor.    GI Function, Nausea and Constipation  Nausea can occasionally be an issue in the first few days after surgery. It is usually caused as a side effect from the anesthesia and pain medicine, particularly narcotics. Taking the pain pills with food is a food way to proactively minimize this. Throwing up, especially after the first day, is not expected and if this happens, you should call your doctor. Feeling gassy and constipation can be a problem for the first week after surgery. Limiting the use of narcotics may be helpful. Stool softeners twice a day and a high fiber diet are safe. If needed, Miralax once daily is a good choice. If no bowel movement after 3 days, you will need to increase the Miralax until soft, regular bowel movements are passing.    Urinating  Because the bladder is disturbed by the surgery, the normal sensation may be temporarily  altered. You may not be aware that your bladder is full. If the bladder is allowed to get over distended, it may make the problem worse. This is why we make sure that you are able to empty your bladder adequately before you go home. For the first few days at home, you should make a point to empty your bladder every 3 to 4 hours. Pain with voiding, especially after the first day, is not expected and may represent a bladder infection.    Activity  For the first two days post-operatively, your soreness and recovery from anesthesia will limit your activity  Stairs are safe, just take your time  At a minimum during this time, you should walk around for 10-15 minutes every 2-3 hours. After that, in the first week, any activity except for overt exercise is safe.   During the first week you should not commit to being on your feet for more than 30 minutes at a time.   During the second week, light exercise is encouraged.  After 2 weeks from surgery, you should try to get back into regular activity other than heavy lifting.   For healing, please limit the amount of weight lifted to 8-10 pounds (a gallon of milk) for the first 6 weeks after surgery.   Driving is usually okay after the first few days. You must be able to comfortably wear a seatbelt, press the gas/brake pedals, and drive defensively. You may not drive while taking narcotic pain medicines.    When to Call the Doctor  Call for any fever above 100.4 F (If you do not feel feverish you do not have to routinely check your temperature.)  Call for severe pain not improved by medications  Call for persistent nausea, vomiting  Call for vaginal bleeding that is heavy as a period or passing blood clots larger than a quarter  Call for unusual swelling in your legs  Call if the incisions develop painful redness and discharge    In an emergency, call 911 or go to an Emergency Department at a nearby hospital

## 2024-08-15 NOTE — ANESTHESIA POSTPROCEDURE EVALUATION
Patient: Vicky Wills    Procedure Summary       Date: 08/15/24 Room / Location: [facility] OR 02 / [facility]    Anesthesia Start: 0714 Anesthesia Stop: 0932    Procedures:       Excision Cyst Laparoscopy Ovary (Right)      COLPOSCOPY, WITH LEEP OF CERVIX Diagnosis:       Dermoid cyst of right ovary      (Dermoid cyst of right ovary [D27.0])    Surgeons: Kimberley Nichols MD Responsible Provider: Marisela Lund MD    Anesthesia Type: general ASA Status: 2            Anesthesia Type: general    Vitals Value Taken Time   /80 08/15/24 0932   Temp 37.0 08/15/24 0932   Pulse 78 08/15/24 0932   Resp 10 08/15/24 0932   SpO2 100 08/15/24 0932       Anesthesia Post Evaluation    Patient location during evaluation: PACU  Patient participation: complete - patient participated  Level of consciousness: awake  Pain management: adequate  Airway patency: patent  Cardiovascular status: acceptable  Respiratory status: acceptable  Hydration status: acceptable  Postoperative Nausea and Vomiting: none        No notable events documented.

## 2024-08-15 NOTE — H&P
Subjective   Vicky Wills is a 43 y.o. female presenting for laparoscopic ovarian cystectomy w/ LEEP in the setting of dermoid cyst of right ovarian and coloposcopy biopsy positive for HSIL, JEVON 2-3.    Preop labs (8/12): Hgb 12.5, Plt 295    Imaging/pathology:    6/13/24:  Colposcopy showing HSIL/JEVON 2-3 on ECC    04/2024 MRI  IMPRESSION:  1. Predominantly fat containing right adnexal lesion measuring 5.1 cm  with no suspicious postcontrast enhancement likely representing  ovarian dermoid/mature cystic teratoma correlating with the recent  ultrasound finding. This could be followed up as clinically indicated  by ultrasound  2. Multiple uterine fibroids noted.  3. Multiple partially complex nabothian cysts noted.    Past Medical History:  Past Medical History:   Diagnosis Date    Acute candidiasis of vulva and vagina 08/02/2018    Yeast vaginitis    Acute vaginitis 02/10/2016    Acute vaginitis    Acute vaginitis 01/21/2016    Bacterial vaginitis    Encounter for screening for infections with a predominantly sexual mode of transmission 04/26/2016    Screen for STD (sexually transmitted disease)    Encounter for screening for malignant neoplasm of vagina     Vaginal Pap smear    Functional dyspepsia     Indigestion    Other fatigue     Tired    Other general symptoms and signs     Change in weight    Other problems related to lifestyle 02/10/2016    Other problems related to lifestyle    Personal history of diseases of the skin and subcutaneous tissue 07/09/2014    History of dermatitis    Personal history of other (healed) physical injury and trauma     History of contusion    Personal history of other diseases of the digestive system     History of constipation    Personal history of other diseases of the female genital tract 02/10/2016    History of vaginal discharge    Personal history of other diseases of the female genital tract 12/14/2013    History of vaginitis    Personal history of other diseases of  the female genital tract 2014    History of vaginal discharge    Personal history of other specified conditions     History of diarrhea    Personal history of other specified conditions     History of heartburn    Personal history of other specified conditions 2016    History of urinary frequency    Postcoital and contact bleeding 04/10/2017    PCB (post coital bleeding)    Unspecified abnormal findings in urine 2016    Abnormal urine findings    Unspecified disorder of nose and nasal sinuses     Sinus problem        Surgical History:  No past surgical history on file.     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 Needs repeat in 1 year, ~2025     OB History    Para Term  AB Living   9 5 5   4 4   SAB IAB Ectopic Multiple Live Births   3 1     4      # Outcome Date GA Lbr Gordon/2nd Weight Sex Type Anes PTL Lv   9 Term      Vag-Spont   FD   8 SAB            7 SAB            6 SAB            5 IAB            4 Term      Vag-Spont   MOHAN   3 Term      Vag-Spont   MOHAN   2 Term      Vag-Spont   MOHAN   1 Term      Vag-Spont   MOHAN      Social History:  Social History     Socioeconomic History    Marital status: Single   Tobacco Use    Smoking status: Never    Smokeless tobacco: Never        Family History:  No family history on file.     Medications:  Spironolactone, tretinoin 0.025% cream    Allergies:  Patient has no known allergies.    Review of Systems   Constitutional: Negative.    HENT: Negative.     Respiratory: Negative.     Cardiovascular: Negative.    Gastrointestinal: Negative.    Genitourinary: Negative.        Objective   Physical Exam  General: no acute distress  HEENT: normocephalic, atraumatic, anicteric   Heart: regular rate, warm and well perfused  Lungs: Breathing comfortably on room air  Abdomen: soft,  "non-tender, non-distended   Extremities: moving all extremities  Neuro: awake and conversant  Psych: appropriate mood and affect    Last Recorded Vitals  Blood pressure 138/65, pulse 74, temperature 36.9 °C (98.4 °F), temperature source Tympanic, resp. rate 16, height 1.63 m (5' 4.17\"), weight 84.7 kg (186 lb 11.7 oz), SpO2 94%.       Assessment/Plan   Vicky Wills is a 43 y.o. female presenting for laparoscopic ovarian cystectomy w/ LEEP in the setting of dermoid cyst of right ovarian and coloposcopy biopsy positive for HSIL, JEVON 2-3.    - Plan for same day discharge  - Consent signed    D/w Dr. Simone Mccall, MS4  "

## 2024-08-15 NOTE — ANESTHESIA PROCEDURE NOTES
Airway  Date/Time: 8/15/2024 7:35 AM  Urgency: elective    Airway not difficult    Staffing  Performed: resident   Authorized by: Marisela Lund MD    Performed by: Hong Concepcion MD  Patient location during procedure: OR    Indications and Patient Condition  Indications for airway management: anesthesia  Spontaneous Ventilation: absent  Sedation level: deep  Preoxygenated: yes  Patient position: sniffing  Mask difficulty assessment: 1 - vent by mask  Planned trial extubation    Final Airway Details  Final airway type: endotracheal airway      Successful airway: ETT  Cuffed: yes   Successful intubation technique: direct laryngoscopy  Facilitating devices/methods: intubating stylet  Endotracheal tube insertion site: oral  Blade: Stephen  Blade size: #3  ETT size (mm): 7.0  Cormack-Lehane Classification: grade I - full view of glottis  Placement verified by: chest auscultation and capnometry   Inital cuff pressure (cm H2O): 5  Measured from: lips  ETT to lips (cm): 22  Number of attempts at approach: 1

## 2024-08-28 LAB
LAB AP ASR DISCLAIMER: NORMAL
LABORATORY COMMENT REPORT: NORMAL
PATH REPORT.COMMENTS IMP SPEC: NORMAL
PATH REPORT.FINAL DX SPEC: NORMAL
PATH REPORT.GROSS SPEC: NORMAL
PATH REPORT.RELEVANT HX SPEC: NORMAL
PATH REPORT.TOTAL CANCER: NORMAL

## 2024-08-30 ENCOUNTER — APPOINTMENT (OUTPATIENT)
Dept: OBSTETRICS AND GYNECOLOGY | Facility: CLINIC | Age: 44
End: 2024-08-30
Payer: MEDICAID

## 2024-08-30 VITALS
DIASTOLIC BLOOD PRESSURE: 60 MMHG | SYSTOLIC BLOOD PRESSURE: 120 MMHG | BODY MASS INDEX: 30.9 KG/M2 | WEIGHT: 181 LBS | HEIGHT: 64 IN

## 2024-08-30 DIAGNOSIS — D27.9 TERATOMA OF OVARY, UNSPECIFIED LATERALITY: ICD-10-CM

## 2024-08-30 DIAGNOSIS — D06.9 CIN III (CERVICAL INTRAEPITHELIAL NEOPLASIA GRADE III) WITH SEVERE DYSPLASIA: Primary | ICD-10-CM

## 2024-08-30 PROCEDURE — 99024 POSTOP FOLLOW-UP VISIT: CPT | Performed by: OBSTETRICS & GYNECOLOGY

## 2024-08-30 PROCEDURE — 3008F BODY MASS INDEX DOCD: CPT | Performed by: OBSTETRICS & GYNECOLOGY

## 2024-08-30 PROCEDURE — 1036F TOBACCO NON-USER: CPT | Performed by: OBSTETRICS & GYNECOLOGY

## 2024-08-30 ASSESSMENT — PAIN SCALES - GENERAL: PAINLEVEL: 0-NO PAIN

## 2024-08-30 NOTE — PROGRESS NOTES
Post-op visit    Assessment/Plan:   1. JEVON III (cervical intraepithelial neoplasia grade III) with severe dysplasia  - pathology negative for dysplasia. Will treat as if positive margins and plan for repeat colposcopy with ECC in 6 months, 2/2025  - follow up for 6 week post op visit to evaluate LEEP site    2. Teratoma of ovary  - recovering well, reviewed benign pathology results    Follow up: 4 weeks     Kimberley Nichols MD      HPI: 2 week follow up s/p laparoscopic ovarian cystectomy and LEEP  Feels well, not on pain meds anymore.   Some discomfort with cough or turning in bed  Normal BM and urination  Vaginal discharge still quite a bit. Yellowish    Objective   Vitals:    08/30/24 1014   BP: 120/60         General Alert and oriented, in no acute distress   Abdomen: Soft, non-tender, without masses or organomegaly, incisions c/d/i

## 2024-09-19 ENCOUNTER — APPOINTMENT (OUTPATIENT)
Dept: DERMATOLOGY | Facility: CLINIC | Age: 44
End: 2024-09-19
Payer: MEDICAID

## 2024-09-27 ENCOUNTER — APPOINTMENT (OUTPATIENT)
Dept: OBSTETRICS AND GYNECOLOGY | Facility: CLINIC | Age: 44
End: 2024-09-27
Payer: MEDICAID

## 2024-09-27 VITALS
WEIGHT: 180 LBS | DIASTOLIC BLOOD PRESSURE: 60 MMHG | SYSTOLIC BLOOD PRESSURE: 120 MMHG | BODY MASS INDEX: 30.73 KG/M2 | HEIGHT: 64 IN

## 2024-09-27 DIAGNOSIS — Z09 SURGERY FOLLOW-UP: Primary | ICD-10-CM

## 2024-09-27 PROCEDURE — 99024 POSTOP FOLLOW-UP VISIT: CPT | Performed by: OBSTETRICS & GYNECOLOGY

## 2024-09-27 PROCEDURE — 3008F BODY MASS INDEX DOCD: CPT | Performed by: OBSTETRICS & GYNECOLOGY

## 2024-09-27 ASSESSMENT — PAIN SCALES - GENERAL: PAINLEVEL: 0-NO PAIN

## 2024-09-27 NOTE — PROGRESS NOTES
"Post-op visit    Assessment/Plan:   1. JEVON III (cervical intraepithelial neoplasia grade III) with severe dysplasia  - pathology negative for dysplasia. Will treat as if positive margins and plan for repeat colposcopy with ECC in 6 months, 2/2025  - LEEP site well healed today     Follow up: Feb 2025 for colposcopy with ECC    Kimberley Nichols MD      HPI: 6 week follow up s/p laparoscopic ovarian cystectomy and LEEP    Reports feeling back to normal. No abnormal discharge. Denies any pain. Feels well.    Objective   Vitals:    09/27/24 1457   BP: 120/60       Objective   /60   Ht 1.626 m (5' 4\")   Wt 81.6 kg (180 lb)   LMP 08/11/2024 (Exact Date)   BMI 30.90 kg/m²      General:   Alert and oriented, in no acute distress               Vulva: Normal architecture without erythema, masses, or lesions.    Vagina: Normal mucosa without lesions, masses, or atrophy. No abnormal vaginal discharge.    Cervix: LEEP site well healed, IUD strings visible                       "

## 2025-02-05 ENCOUNTER — APPOINTMENT (OUTPATIENT)
Dept: DERMATOLOGY | Facility: CLINIC | Age: 45
End: 2025-02-05
Payer: MEDICAID

## 2025-02-21 ENCOUNTER — APPOINTMENT (OUTPATIENT)
Dept: OBSTETRICS AND GYNECOLOGY | Facility: CLINIC | Age: 45
End: 2025-02-21
Payer: MEDICAID

## 2025-02-21 VITALS
DIASTOLIC BLOOD PRESSURE: 66 MMHG | BODY MASS INDEX: 33.13 KG/M2 | WEIGHT: 187 LBS | HEIGHT: 63 IN | SYSTOLIC BLOOD PRESSURE: 120 MMHG

## 2025-02-21 DIAGNOSIS — D06.9 CIN III (CERVICAL INTRAEPITHELIAL NEOPLASIA GRADE III) WITH SEVERE DYSPLASIA: ICD-10-CM

## 2025-02-21 LAB — PREGNANCY TEST URINE, POC: NEGATIVE

## 2025-02-21 PROCEDURE — 88342 IMHCHEM/IMCYTCHM 1ST ANTB: CPT | Performed by: PATHOLOGY

## 2025-02-21 PROCEDURE — 87624 HPV HI-RISK TYP POOLED RSLT: CPT

## 2025-02-21 PROCEDURE — 88142 CYTOPATH C/V THIN LAYER: CPT

## 2025-02-21 PROCEDURE — 88342 IMHCHEM/IMCYTCHM 1ST ANTB: CPT

## 2025-02-21 PROCEDURE — 88141 CYTOPATH C/V INTERPRET: CPT | Performed by: PATHOLOGY

## 2025-02-21 PROCEDURE — 81025 URINE PREGNANCY TEST: CPT | Performed by: OBSTETRICS & GYNECOLOGY

## 2025-02-21 PROCEDURE — 88305 TISSUE EXAM BY PATHOLOGIST: CPT

## 2025-02-21 PROCEDURE — 88305 TISSUE EXAM BY PATHOLOGIST: CPT | Performed by: PATHOLOGY

## 2025-02-21 PROCEDURE — 0753T DGTZ GLS MCRSCP SLD LEVEL IV: CPT

## 2025-02-21 PROCEDURE — 0760T DGTZ GLS MCRSCP SL IMM 1ST: CPT

## 2025-02-21 ASSESSMENT — PAIN SCALES - GENERAL: PAINLEVEL_OUTOF10: 0-NO PAIN

## 2025-02-21 NOTE — PROGRESS NOTES
Patient ID: Vicky Wills is a 44 y.o. female presenting for colposcopy. She had a LEEP for JEVON II-III and pathology was negative for dysplasia.    Colposcopy    Date/Time: 2/21/2025 2:46 PM    Performed by: Kimberley Nichols MD  Authorized by: Kimberley Nichols MD    Procedure location: cervix    Consent:     Patient questions answered: yes      Risks and benefits of the procedure and its alternatives discussed: yes      Procedural risks discussed:  Bleeding, infection and repeat procedure    Consent obtained:  Verbal and written    Consent given by:  Patient  Indication:     Cervical indication(s): JEVON 3    Pre-procedure:     Prep solution(s): acetic acid    Procedure:     Colposcopy with: cervical biopsy and endocervical curettage      Colposcopy details:  Evidence of well healed LEEP  Slight acetowhite changes around 3 and 9 o clock    Cervix visibility: fully visualized      SCJ visibility: fully visualized      Lesion visualized: fully visualized      Acetowhite lesion(s): cervix      Cervical impression: low grade    Post-procedure:     Patient tolerance of procedure:  Patient tolerated the procedure well with no immediate complications    Plan of care will be determined based on test results

## 2025-03-07 LAB
CYTOLOGY CMNT CVX/VAG CYTO-IMP: NORMAL
HPV HR 12 DNA GENITAL QL NAA+PROBE: POSITIVE
HPV HR GENOTYPES PNL CVX NAA+PROBE: POSITIVE
HPV16 DNA SPEC QL NAA+PROBE: NEGATIVE
HPV18 DNA SPEC QL NAA+PROBE: NEGATIVE
LAB AP HPV GENOTYPE QUESTION: YES
LAB AP HPV HR: NORMAL
LABORATORY COMMENT REPORT: NORMAL
LABORATORY COMMENT REPORT: NORMAL
LMP START DATE: NORMAL
PATH REPORT.TOTAL CANCER: NORMAL

## 2025-03-10 ENCOUNTER — PREP FOR PROCEDURE (OUTPATIENT)
Dept: OBSTETRICS AND GYNECOLOGY | Facility: CLINIC | Age: 45
End: 2025-03-10
Payer: MEDICAID

## 2025-03-10 ENCOUNTER — TELEPHONE (OUTPATIENT)
Dept: OBSTETRICS AND GYNECOLOGY | Facility: CLINIC | Age: 45
End: 2025-03-10
Payer: MEDICAID

## 2025-03-10 DIAGNOSIS — D06.9 CIN III (CERVICAL INTRAEPITHELIAL NEOPLASIA III): Primary | ICD-10-CM

## 2025-03-10 NOTE — TELEPHONE ENCOUNTER
Called patient to review Colposcopy results showing JEVON 2-3 on ECC after LEEP procedure. I discussed options of repeat LEEP/cone vs hysterectomy. I discussed risks and benefits of each. She prefers to move forward with the LEEP/cone. Scheduling request placed.

## 2025-03-12 PROBLEM — D06.9 CIN III (CERVICAL INTRAEPITHELIAL NEOPLASIA III): Status: ACTIVE | Noted: 2025-03-10

## 2025-03-31 ENCOUNTER — ANESTHESIA EVENT (OUTPATIENT)
Dept: OPERATING ROOM | Facility: HOSPITAL | Age: 45
End: 2025-03-31
Payer: MEDICAID

## 2025-04-01 ENCOUNTER — HOSPITAL ENCOUNTER (OUTPATIENT)
Facility: HOSPITAL | Age: 45
Setting detail: OUTPATIENT SURGERY
Discharge: HOME | End: 2025-04-01
Attending: OBSTETRICS & GYNECOLOGY | Admitting: OBSTETRICS & GYNECOLOGY
Payer: MEDICAID

## 2025-04-01 ENCOUNTER — ANESTHESIA (OUTPATIENT)
Dept: OPERATING ROOM | Facility: HOSPITAL | Age: 45
End: 2025-04-01
Payer: MEDICAID

## 2025-04-01 VITALS
SYSTOLIC BLOOD PRESSURE: 132 MMHG | DIASTOLIC BLOOD PRESSURE: 80 MMHG | TEMPERATURE: 97.5 F | HEART RATE: 65 BPM | BODY MASS INDEX: 35.34 KG/M2 | WEIGHT: 180 LBS | HEIGHT: 60 IN | OXYGEN SATURATION: 96 % | RESPIRATION RATE: 18 BRPM

## 2025-04-01 DIAGNOSIS — D06.9 CIN III (CERVICAL INTRAEPITHELIAL NEOPLASIA III): Primary | ICD-10-CM

## 2025-04-01 LAB — PREGNANCY TEST URINE, POC: NEGATIVE

## 2025-04-01 PROCEDURE — 88307 TISSUE EXAM BY PATHOLOGIST: CPT | Mod: TC,SUR | Performed by: OBSTETRICS & GYNECOLOGY

## 2025-04-01 PROCEDURE — 3600000008 HC OR TIME - EACH INCREMENTAL 1 MINUTE - PROCEDURE LEVEL THREE: Performed by: OBSTETRICS & GYNECOLOGY

## 2025-04-01 PROCEDURE — 7100000010 HC PHASE TWO TIME - EACH INCREMENTAL 1 MINUTE: Performed by: OBSTETRICS & GYNECOLOGY

## 2025-04-01 PROCEDURE — 2500000004 HC RX 250 GENERAL PHARMACY W/ HCPCS (ALT 636 FOR OP/ED): Mod: SE

## 2025-04-01 PROCEDURE — 2720000007 HC OR 272 NO HCPCS: Performed by: OBSTETRICS & GYNECOLOGY

## 2025-04-01 PROCEDURE — 58301 REMOVE INTRAUTERINE DEVICE: CPT | Performed by: OBSTETRICS & GYNECOLOGY

## 2025-04-01 PROCEDURE — 88307 TISSUE EXAM BY PATHOLOGIST: CPT | Performed by: STUDENT IN AN ORGANIZED HEALTH CARE EDUCATION/TRAINING PROGRAM

## 2025-04-01 PROCEDURE — 57460 BX OF CERVIX W/SCOPE LEEP: CPT | Performed by: OBSTETRICS & GYNECOLOGY

## 2025-04-01 PROCEDURE — 3700000001 HC GENERAL ANESTHESIA TIME - INITIAL BASE CHARGE: Performed by: OBSTETRICS & GYNECOLOGY

## 2025-04-01 PROCEDURE — 2500000005 HC RX 250 GENERAL PHARMACY W/O HCPCS: Mod: SE | Performed by: OBSTETRICS & GYNECOLOGY

## 2025-04-01 PROCEDURE — 7100000009 HC PHASE TWO TIME - INITIAL BASE CHARGE: Performed by: OBSTETRICS & GYNECOLOGY

## 2025-04-01 PROCEDURE — A57461 PR COLPOSCOPY,CERVIX W/ADJ VAG,W/LOOP CONIZ: Performed by: ANESTHESIOLOGY

## 2025-04-01 PROCEDURE — A57461 PR COLPOSCOPY,CERVIX W/ADJ VAG,W/LOOP CONIZ: Performed by: NURSE ANESTHETIST, CERTIFIED REGISTERED

## 2025-04-01 PROCEDURE — 88342 IMHCHEM/IMCYTCHM 1ST ANTB: CPT | Performed by: STUDENT IN AN ORGANIZED HEALTH CARE EDUCATION/TRAINING PROGRAM

## 2025-04-01 PROCEDURE — 3700000002 HC GENERAL ANESTHESIA TIME - EACH INCREMENTAL 1 MINUTE: Performed by: OBSTETRICS & GYNECOLOGY

## 2025-04-01 PROCEDURE — 81025 URINE PREGNANCY TEST: CPT | Performed by: OBSTETRICS & GYNECOLOGY

## 2025-04-01 PROCEDURE — 3600000003 HC OR TIME - INITIAL BASE CHARGE - PROCEDURE LEVEL THREE: Performed by: OBSTETRICS & GYNECOLOGY

## 2025-04-01 RX ORDER — MEPERIDINE HYDROCHLORIDE 25 MG/ML
12.5 INJECTION INTRAMUSCULAR; INTRAVENOUS; SUBCUTANEOUS EVERY 10 MIN PRN
Status: CANCELLED | OUTPATIENT
Start: 2025-04-01

## 2025-04-01 RX ORDER — DROPERIDOL 2.5 MG/ML
0.62 INJECTION, SOLUTION INTRAMUSCULAR; INTRAVENOUS ONCE AS NEEDED
Status: CANCELLED | OUTPATIENT
Start: 2025-04-01

## 2025-04-01 RX ORDER — HYDROMORPHONE HYDROCHLORIDE 0.2 MG/ML
0.2 INJECTION INTRAMUSCULAR; INTRAVENOUS; SUBCUTANEOUS EVERY 5 MIN PRN
Status: CANCELLED | OUTPATIENT
Start: 2025-04-01

## 2025-04-01 RX ORDER — SODIUM CHLORIDE 0.9 G/100ML
INJECTION, SOLUTION IRRIGATION AS NEEDED
Status: DISCONTINUED | OUTPATIENT
Start: 2025-04-01 | End: 2025-04-01 | Stop reason: HOSPADM

## 2025-04-01 RX ORDER — ALBUTEROL SULFATE 0.83 MG/ML
2.5 SOLUTION RESPIRATORY (INHALATION) ONCE AS NEEDED
Status: CANCELLED | OUTPATIENT
Start: 2025-04-01

## 2025-04-01 RX ORDER — MIDAZOLAM HYDROCHLORIDE 1 MG/ML
1 INJECTION INTRAMUSCULAR; INTRAVENOUS ONCE AS NEEDED
Status: CANCELLED | OUTPATIENT
Start: 2025-04-01

## 2025-04-01 RX ORDER — KETOROLAC TROMETHAMINE 30 MG/ML
INJECTION, SOLUTION INTRAMUSCULAR; INTRAVENOUS AS NEEDED
Status: DISCONTINUED | OUTPATIENT
Start: 2025-04-01 | End: 2025-04-01

## 2025-04-01 RX ORDER — OXYCODONE HYDROCHLORIDE 5 MG/1
5 TABLET ORAL EVERY 4 HOURS PRN
Status: CANCELLED | OUTPATIENT
Start: 2025-04-01

## 2025-04-01 RX ORDER — SODIUM CHLORIDE, SODIUM LACTATE, POTASSIUM CHLORIDE, CALCIUM CHLORIDE 600; 310; 30; 20 MG/100ML; MG/100ML; MG/100ML; MG/100ML
100 INJECTION, SOLUTION INTRAVENOUS CONTINUOUS
Status: CANCELLED | OUTPATIENT
Start: 2025-04-01 | End: 2025-04-02

## 2025-04-01 RX ORDER — FENTANYL CITRATE 50 UG/ML
INJECTION, SOLUTION INTRAMUSCULAR; INTRAVENOUS AS NEEDED
Status: DISCONTINUED | OUTPATIENT
Start: 2025-04-01 | End: 2025-04-01

## 2025-04-01 RX ORDER — PROPOFOL 10 MG/ML
INJECTION, EMULSION INTRAVENOUS CONTINUOUS PRN
Status: DISCONTINUED | OUTPATIENT
Start: 2025-04-01 | End: 2025-04-01

## 2025-04-01 RX ORDER — DEXMEDETOMIDINE IN 0.9 % NACL 20 MCG/5ML
SYRINGE (ML) INTRAVENOUS AS NEEDED
Status: DISCONTINUED | OUTPATIENT
Start: 2025-04-01 | End: 2025-04-01

## 2025-04-01 RX ORDER — ONDANSETRON HYDROCHLORIDE 2 MG/ML
INJECTION, SOLUTION INTRAVENOUS AS NEEDED
Status: DISCONTINUED | OUTPATIENT
Start: 2025-04-01 | End: 2025-04-01

## 2025-04-01 RX ORDER — LABETALOL HYDROCHLORIDE 5 MG/ML
5 INJECTION, SOLUTION INTRAVENOUS ONCE AS NEEDED
Status: CANCELLED | OUTPATIENT
Start: 2025-04-01

## 2025-04-01 RX ORDER — METHOCARBAMOL 100 MG/ML
1000 INJECTION, SOLUTION INTRAMUSCULAR; INTRAVENOUS ONCE
Status: CANCELLED | OUTPATIENT
Start: 2025-04-01 | End: 2025-04-01

## 2025-04-01 RX ORDER — LIDOCAINE HYDROCHLORIDE 10 MG/ML
0.1 INJECTION, SOLUTION EPIDURAL; INFILTRATION; INTRACAUDAL; PERINEURAL ONCE
Status: CANCELLED | OUTPATIENT
Start: 2025-04-01 | End: 2025-04-01

## 2025-04-01 RX ORDER — SODIUM CHLORIDE, SODIUM LACTATE, POTASSIUM CHLORIDE, CALCIUM CHLORIDE 600; 310; 30; 20 MG/100ML; MG/100ML; MG/100ML; MG/100ML
INJECTION, SOLUTION INTRAVENOUS CONTINUOUS PRN
Status: DISCONTINUED | OUTPATIENT
Start: 2025-04-01 | End: 2025-04-01

## 2025-04-01 RX ORDER — MIDAZOLAM HYDROCHLORIDE 1 MG/ML
INJECTION INTRAMUSCULAR; INTRAVENOUS AS NEEDED
Status: DISCONTINUED | OUTPATIENT
Start: 2025-04-01 | End: 2025-04-01

## 2025-04-01 RX ORDER — ACETIC ACID 5 %
LIQUID (ML) MISCELLANEOUS CONTINUOUS PRN
Status: COMPLETED | OUTPATIENT
Start: 2025-04-01 | End: 2025-04-01

## 2025-04-01 RX ADMIN — SODIUM CHLORIDE, POTASSIUM CHLORIDE, SODIUM LACTATE AND CALCIUM CHLORIDE: 600; 310; 30; 20 INJECTION, SOLUTION INTRAVENOUS at 13:08

## 2025-04-01 RX ADMIN — FENTANYL CITRATE 25 MCG: 50 INJECTION, SOLUTION INTRAMUSCULAR; INTRAVENOUS at 13:21

## 2025-04-01 RX ADMIN — Medication 20 MCG: at 13:08

## 2025-04-01 RX ADMIN — FENTANYL CITRATE 50 MCG: 50 INJECTION, SOLUTION INTRAMUSCULAR; INTRAVENOUS at 13:13

## 2025-04-01 RX ADMIN — PROPOFOL 80 MCG/KG/MIN: 10 INJECTION, EMULSION INTRAVENOUS at 13:10

## 2025-04-01 RX ADMIN — MIDAZOLAM HYDROCHLORIDE 2 MG: 2 INJECTION, SOLUTION INTRAMUSCULAR; INTRAVENOUS at 13:08

## 2025-04-01 RX ADMIN — ONDANSETRON 4 MG: 2 INJECTION INTRAMUSCULAR; INTRAVENOUS at 13:22

## 2025-04-01 RX ADMIN — KETOROLAC TROMETHAMINE 30 MG: 30 INJECTION, SOLUTION INTRAMUSCULAR at 13:22

## 2025-04-01 RX ADMIN — SODIUM CHLORIDE, POTASSIUM CHLORIDE, SODIUM LACTATE AND CALCIUM CHLORIDE: 600; 310; 30; 20 INJECTION, SOLUTION INTRAVENOUS at 13:09

## 2025-04-01 RX ADMIN — DEXAMETHASONE SODIUM PHOSPHATE 4 MG: 4 INJECTION, SOLUTION INTRA-ARTICULAR; INTRALESIONAL; INTRAMUSCULAR; INTRAVENOUS; SOFT TISSUE at 13:21

## 2025-04-01 ASSESSMENT — PAIN SCALES - GENERAL
PAIN_LEVEL: 2
PAINLEVEL_OUTOF10: 0 - NO PAIN

## 2025-04-01 ASSESSMENT — PAIN - FUNCTIONAL ASSESSMENT
PAIN_FUNCTIONAL_ASSESSMENT: UNABLE TO SELF-REPORT
PAIN_FUNCTIONAL_ASSESSMENT: UNABLE TO SELF-REPORT
PAIN_FUNCTIONAL_ASSESSMENT: 0-10

## 2025-04-01 ASSESSMENT — COLUMBIA-SUICIDE SEVERITY RATING SCALE - C-SSRS
6. HAVE YOU EVER DONE ANYTHING, STARTED TO DO ANYTHING, OR PREPARED TO DO ANYTHING TO END YOUR LIFE?: NO
1. IN THE PAST MONTH, HAVE YOU WISHED YOU WERE DEAD OR WISHED YOU COULD GO TO SLEEP AND NOT WAKE UP?: NO
2. HAVE YOU ACTUALLY HAD ANY THOUGHTS OF KILLING YOURSELF?: NO

## 2025-04-01 NOTE — H&P
Department of Obstetrics and Gynecology  Gynecology History and Physical    CC: scheduled surgery    HPI:  44 y.o.  female presents today for planned repeat LEEP for JEVON 2-3 on ECC after office LEEP procedure.  She has no complaints today and no changes to her medical history since her last visit. Pt reports that she would like to have her IUD removed at the time of her LEEP procedure. She reports that she has had it in place and is not having any concerns with bleeding or pain at this time 2/2 IUD but would just feel better if it were removed. Discussed with patient that having the IUD will not impact her status of atypical cervical biopsy and all questions were answered.      OB History    Para Term  AB Living   9 5 5 0 4 4   SAB IAB Ectopic Multiple Live Births   3 1 0 0 4      # Outcome Date GA Lbr Gordon/2nd Weight Sex Type Anes PTL Lv   9 Term      Vag-Spont   FD   8 SAB            7 SAB            6 SAB            5 IAB            4 Term      Vag-Spont   MOHAN   3 Term      Vag-Spont   MOHAN   2 Term      Vag-Spont   MOHAN   1 Term      Vag-Spont   MOHAN       Past Medical History:   Diagnosis Date    Acute candidiasis of vulva and vagina 2018    Yeast vaginitis    Acute vaginitis 02/10/2016    Acute vaginitis    Acute vaginitis 2016    Bacterial vaginitis    Encounter for screening for infections with a predominantly sexual mode of transmission 2016    Screen for STD (sexually transmitted disease)    Encounter for screening for malignant neoplasm of vagina     Vaginal Pap smear    Functional dyspepsia     Indigestion    Other fatigue     Tired    Other general symptoms and signs     Change in weight    Other problems related to lifestyle 02/10/2016    Other problems related to lifestyle    Personal history of diseases of the skin and subcutaneous tissue 2014    History of dermatitis    Personal history of other (healed) physical injury and trauma     History of contusion     Personal history of other diseases of the digestive system     History of constipation    Personal history of other diseases of the female genital tract 02/10/2016    History of vaginal discharge    Personal history of other diseases of the female genital tract 12/14/2013    History of vaginitis    Personal history of other diseases of the female genital tract 07/09/2014    History of vaginal discharge    Personal history of other specified conditions     History of diarrhea    Personal history of other specified conditions     History of heartburn    Personal history of other specified conditions 04/26/2016    History of urinary frequency    Postcoital and contact bleeding 04/10/2017    PCB (post coital bleeding)    Unspecified abnormal findings in urine 04/26/2016    Abnormal urine findings    Unspecified disorder of nose and nasal sinuses     Sinus problem       History reviewed. No pertinent surgical history.    Medications:  Prior to Admission medications    Medication Sig Start Date End Date Taking? Authorizing Provider   acetaminophen (Tylenol Extra Strength) 500 mg tablet Take 2 tablets (1,000 mg) by mouth every 6 hours if needed for mild pain (1 - 3). 8/15/24   Deisi Gay MD   oxyCODONE (Roxicodone) 5 mg immediate release tablet Take 1 tablet (5 mg) by mouth every 4 hours if needed for severe pain (7 - 10).  Patient not taking: Reported on 2/21/2025 8/15/24   Deisi Gay MD   spironolactone (Aldactone) 50 mg tablet Take 1 tablet (50mg) by mouth once daily. After 2 weeks, try to increase to 2 tablets (100 mg) by mouth once daily.  Patient not taking: Reported on 2/21/2025 6/20/24   Domitila López DO   tretinoin (Retin-A) 0.025 % cream Apply topically once daily at bedtime. A pea-sized amount to the whole face, start every 2-3 nights and gradually increase to nightly  Patient not taking: Reported on 2/21/2025 6/20/24 6/20/25  Domitila López DO       Allergies:  has No Known  Allergies.    Social History:   reports that she has never smoked. She has never used smokeless tobacco. She reports current alcohol use. She reports that she does not use drugs.    Family History:  family history is not on file.    Review of Systems  Negative 10 point unless noted above.     Objective   Vitals:    04/01/25 0800   BP: 153/86   Pulse: 68   Resp: 18   Temp: 36.3 °C (97.3 °F)   SpO2: 95%   Weight: 81.6 kg (180 lb)   Height: 1.524 m (5')     Constitutional: No visible distress, alert and cooperative  Eyes: clear sclera  Head/Neck: Normocephalic  Respiratory/Thorax: Normal respiratory effort on RA  Abdomen: soft, nondistended without tenderness to palpation  Musculoskeletal: grossly normal ROM  Extremities: BLEs symmetrical   Neurological: A&Ox3  Psychological: Appropriate mood and behavior  Skin: warm, dry, no lesions noted     Admission on 04/01/2025   Component Date Value    Preg Test, Ur 04/01/2025 Negative      Path results   Surgical pathology LEEP/ECC 2/2025  A. Endocervix, curettage:  -- Detached fragments of high grade squamous intraepithelial lesion (HSIL / JEVON 2-3).  -- Superficial strips of cytologically benign endocervix.  -- Immunohistochemical stain performed on formalin-fixed, paraffin embedded section demonstrates p16 to be positive (block pattern) in dysplastic cells.     B. Cervix, 3 o'clock, biopsy:  -- Fragment of squamous mucosa negative for dysplasia.  -- No transformation zone identified.     C. Cervix, 9 o'clock, biopsy:  -- Fragment of squamous mucosa negative for dysplasia.  -- No transformation zone identified.    Assessment/Plan   44 y.o. female presenting for repeat LEEP procedure and IUD removal.    Clinical status: stable  Patient consented for LEEP and IUD removal, risks discussed including bleeding, risk of infection and damage to surrounding structures. Discussed pathology evaluation of tissue specimen and any further treatments to be decided on based on results with   Simone outpatient.   Discussed IUD removal with patient and that it will not impact the course of cervical dysplasia and answered all questions. Pt strongly desiring IUD removal today. Added to consent and discussed.   All questions answered to the best of my ability  Postop precautions and instructions reviewed  UPT negative. T&S pending   She and her mom voiced understanding and agree to proceed    Dispo: anticipate discharge home following her procedure.     Please contact the GYN team by paging [phone]     Pt to be seen and discussed with Dr. Simone Vanessa MD PGY-1

## 2025-04-01 NOTE — DISCHARGE INSTRUCTIONS
What care is needed at home?  You can shower without restrictions  Do not put anything in your vagina until cleared by your provider: no sex, douching, tampons  You can expect some bleeding from your vagina for a few weeks. You may use sanitary pads but not tampons. Vaginal spotting is normal, but if you have heavy bleeding through 1 pad per hour for 2 hours in a row, please call.  Your bowel movements may take some time to get back to normal. Eat small meals high in fiber to avoid hard stools. Drink 6 to 8 glasses of water each day.  What follow-up care is needed?  We will call you to schedule a follow up visit with your surgeon. Be sure to keep your visits.  What drugs may be needed?  You can take ibuprofen and/or tylenol as needed for pain.  What problems could happen?  Infection  Heavy blood loss  Blood clots in your legs or lungs  Damage to your bowel, bladder, and other organs inside the belly  Trouble passing bowel movements  When do I need to call the doctor?  Signs of infection such as a fever of 100.4°F (38°C) or higher, chills, pain with passing urine.  Excessive blood in your sanitary pads or more than six soaked pads in a day. (Spotting is normal).  Smelly green or dark yellow vaginal discharge  Upset stomach, throwing up, or very bad belly pain  Pain not helped by drugs you are taking  No bowel movement after 3 days  If you feel the need to pass urine but urine will not come out even after 6 hours  Swelling in your leg or arm that is much greater on one side than the other

## 2025-04-01 NOTE — OP NOTE
Date: 2025  OR Location: [facility] OR    Name: Vicky Wills, : 1980, Age: 44 y.o., MRN: [id number], Sex: female    Diagnosis  Pre-op Diagnosis      * JEOVN III (cervical intraepithelial neoplasia III) [D06.9] Post-op Diagnosis     * JEVON III (cervical intraepithelial neoplasia III) [D06.9]     Procedures  EXCISION, LESION, CERVIX  88355 - NC COLPOSCOPY CERVIX VAG ELTRD CONIZATION CERVIX  Removal of IUD    Indications  History of prior LEEP w/ JEVON 2  Encounter for IUD removal    Surgeons      * Kimberley Nichols - Primary    Resident/Fellow/Other Assistant:  Surgeons and Role:     * Luis Eduardo Morales MD - Resident - Assisting    Staff:   Adrianoulator: Matt Nichols Circulator: Joleen Houston Person: Kailyn    Anesthesia Staff: Anesthesiologist: Bar Kinsey MD  CRNA: LUIS FERNANDO Pearson-CRNA  SRNA: Gloria Conroy    Procedure Summary  Anesthesia: Monitor Anesthesia Care  ASA: II  Estimated Blood Loss: 5 mL  Intra-op Medications: Administrations occurring from 0920 to 1035 on 25:  * No intraprocedure medications in log *           Anesthesia Record               Intraprocedure I/O Totals          Intake    LR infusion 600.00 mL    Total Intake 600 mL          Specimen:   ID Type Source Tests Collected by Time   1 : lide side of cervix with stitch Tissue CERVIX LEEP SURGICAL PATHOLOGY EXAM Kimberley Nichols MD 2025 1346   2 : top hat Tissue CERVIX LEEP SURGICAL PATHOLOGY EXAM Kimberley Nichols MD 2025 1350        Procedure Details:  The patient was seen in the preoperative area. The site of surgery was properly noted/marked if necessary per policy. The patient has been actively warmed in preoperative area. Preoperative antibiotics are not indicated. Venous thrombosis prophylaxis are not indicated.    Findings: Normal appearing external genitalia. Cervix fully visualized, intact, grossly normal appearing and consistent with prior LEEP procedure. IUD in place, strings grossly  visualized    The patient was taken to the operating room where MAC anesthesia was administered. She was then positioned in the dorsal lithotomy position. Next, a coated speculum was placed into the vagina and the patient was placed into Trendelenburg. With some manipulation the posterior lip of the cervix was grasped with a single-tooth tenaculum and it was brought into view. Using ring forceps, the IUD strings were grasped and the IUD was removed intact. Using the colposcope, there were no gross cervical abnormalities. Then dilute acetic acid was then applied to the cervix. No acetowhite lesions were noted. The tenaculum was moved to the anterior lip and the LEEP was performed with the 20 mm loop and removed in two separate pieces. The specimen appeared adequate. The left side of the cervical specimen was tagged at 12 o'clock with a stitch. A top hat was subsequently performed at the area of the cervical os using a 10mm loop.The LEEP base was coagulated with the Bovie roller ball and monsels applied to ongoing bleeders. Good hemostasis was noted. The speculum and tenaculum was removed. All counts were correct.  The specimen was sent to pathology.  Dr. Nichols  was present for entire procedure.    The patient was taken from the operating room in stable condition after reversal of anesthesia. She discharged home on the day of surgery with a prescription for ibuprofen for post-operative pain management.       Complications:  None; patient tolerated the procedure well.     Disposition: PACU - hemodynamically stable.  Condition: stable

## 2025-04-01 NOTE — ANESTHESIA PREPROCEDURE EVALUATION
Patient: Vicky Wills    Procedure Information       Anesthesia Start Date/Time: 04/01/25 1308    Procedure: EXCISION, LESION, CERVIX    Location: [facility] 01 / [facility] OR    Surgeons: Kimberley Nichols MD            Relevant Problems   No relevant active problems       Clinical information reviewed:   Tobacco  Allergies    Med Hx  Surg Hx   Fam Hx  Soc Hx        NPO Detail:  NPO/Void Status  Date of Last Liquid: 04/01/25  Time of Last Liquid: 0000  Date of Last Solid: 04/01/25  Time of Last Solid: 0000         Physical Exam    Airway  Mallampati: II     Cardiovascular - normal exam  Rhythm: regular  Rate: normal     Dental    Pulmonary - normal exam     Abdominal - normal exam         Anesthesia Plan    History of general anesthesia?: unknown/emergency  History of complications of general anesthesia?: no    ASA 2     MAC     Anesthetic plan and risks discussed with patient.    Plan discussed with CRNA and CAA.

## 2025-04-01 NOTE — ANESTHESIA POSTPROCEDURE EVALUATION
Patient: Vicky Wills    Procedure Summary       Date: 04/01/25 Room / Location: [facility] OR 01 / [facility]    Anesthesia Start: 1308 Anesthesia Stop: 1410    Procedure: EXCISION, LESION, CERVIX Diagnosis:       JEVON III (cervical intraepithelial neoplasia III)      (JEVON III (cervical intraepithelial neoplasia III) [D06.9])    Surgeons: Kimberley Nichols MD Responsible Provider: Bar Kinsey MD    Anesthesia Type: MAC ASA Status: 2            Anesthesia Type: MAC    Vitals Value Taken Time   /72 04/01/25 1415   Temp 36.4 °C (97.5 °F) 04/01/25 1406   Pulse 51 04/01/25 1424   Resp 16 04/01/25 1406   SpO2 95 % 04/01/25 1424   Vitals shown include unfiled device data.    Anesthesia Post Evaluation    Patient location during evaluation: PACU  Patient participation: complete - patient participated  Level of consciousness: awake and alert  Pain score: 2  Pain management: adequate  Airway patency: patent  Cardiovascular status: acceptable  Respiratory status: acceptable  Hydration status: acceptable  Postoperative Nausea and Vomiting: mild    There were no known notable events for this encounter.

## 2025-04-07 ENCOUNTER — APPOINTMENT (OUTPATIENT)
Dept: OBSTETRICS AND GYNECOLOGY | Facility: CLINIC | Age: 45
End: 2025-04-07
Payer: MEDICAID

## 2025-04-07 VITALS
HEIGHT: 60 IN | WEIGHT: 180 LBS | BODY MASS INDEX: 35.34 KG/M2 | SYSTOLIC BLOOD PRESSURE: 120 MMHG | DIASTOLIC BLOOD PRESSURE: 84 MMHG

## 2025-04-07 DIAGNOSIS — N93.9 ABNORMAL UTERINE BLEEDING (AUB): Primary | ICD-10-CM

## 2025-04-07 DIAGNOSIS — Z09 SURGERY FOLLOW-UP: ICD-10-CM

## 2025-04-07 PROCEDURE — 1036F TOBACCO NON-USER: CPT | Performed by: OBSTETRICS & GYNECOLOGY

## 2025-04-07 PROCEDURE — 3008F BODY MASS INDEX DOCD: CPT | Performed by: OBSTETRICS & GYNECOLOGY

## 2025-04-07 PROCEDURE — 99024 POSTOP FOLLOW-UP VISIT: CPT | Performed by: OBSTETRICS & GYNECOLOGY

## 2025-04-07 RX ORDER — MEDROXYPROGESTERONE ACETATE 10 MG/1
10 TABLET ORAL DAILY
Qty: 30 TABLET | Refills: 11 | Status: SHIPPED | OUTPATIENT
Start: 2025-04-07 | End: 2026-04-07

## 2025-04-07 ASSESSMENT — PAIN SCALES - GENERAL: PAINLEVEL_OUTOF10: 0-NO PAIN

## 2025-04-07 NOTE — PROGRESS NOTES
Post-op visit    Assessment/Plan:   1. Abnormal uterine bleeding (AUB) (Primary)  - small amount of bleeding from LEEP site - monsels placed and hemostasis noted. Also with some bleeding from uterus likely due to removing IUD. Recommend provera to lighten bleeding.   - medroxyPROGESTERone (Provera) 10 mg tablet; Take 1 tablet (10 mg) by mouth once daily.  Dispense: 30 tablet; Refill: 11    2. Surgery follow-up  - pathology pending, follow up plan will be determined based on results    Follow up: 4/22 or sooner PRN    Kimberley Nichols MD      HPI: Patient presenting POD#6 s/p LEEP and IUD removal for vaginal bleeding.     She only had small amounts of brown discharge the first 3 days and then started having heavier bleeding 3 days ago.       Objective   Vitals:    04/07/25 0922   BP: 120/84         General Alert and oriented, in no acute distress       Vulva: Normal architecture without erythema, masses, or lesions.    Vagina: Normal mucosa without lesions, masses, or atrophy. No abnormal vaginal discharge.    Cervix: LEEP site with small oos from left side ~3 o clock. Slight bleeding from os as well

## 2025-04-17 ENCOUNTER — APPOINTMENT (OUTPATIENT)
Dept: OBSTETRICS AND GYNECOLOGY | Facility: CLINIC | Age: 45
End: 2025-04-17
Payer: MEDICAID

## 2025-04-17 LAB
LAB AP ASR DISCLAIMER: NORMAL
LABORATORY COMMENT REPORT: NORMAL
PATH REPORT.COMMENTS IMP SPEC: NORMAL
PATH REPORT.FINAL DX SPEC: NORMAL
PATH REPORT.GROSS SPEC: NORMAL
PATH REPORT.RELEVANT HX SPEC: NORMAL
PATH REPORT.TOTAL CANCER: NORMAL
RESIDENT REVIEW: NORMAL

## 2025-04-22 ENCOUNTER — APPOINTMENT (OUTPATIENT)
Dept: OBSTETRICS AND GYNECOLOGY | Facility: CLINIC | Age: 45
End: 2025-04-22
Payer: MEDICAID

## 2025-04-22 ENCOUNTER — PREP FOR PROCEDURE (OUTPATIENT)
Dept: OBSTETRICS AND GYNECOLOGY | Facility: CLINIC | Age: 45
End: 2025-04-22

## 2025-04-22 VITALS
DIASTOLIC BLOOD PRESSURE: 70 MMHG | SYSTOLIC BLOOD PRESSURE: 116 MMHG | BODY MASS INDEX: 37.69 KG/M2 | WEIGHT: 192 LBS | HEIGHT: 60 IN

## 2025-04-22 DIAGNOSIS — D06.9 CIN III WITH SEVERE DYSPLASIA: Primary | ICD-10-CM

## 2025-04-22 DIAGNOSIS — Z98.890 S/P LEEP: ICD-10-CM

## 2025-04-22 DIAGNOSIS — N89.8 VAGINAL IRRITATION: ICD-10-CM

## 2025-04-22 DIAGNOSIS — D06.9 CIN III (CERVICAL INTRAEPITHELIAL NEOPLASIA III): Primary | ICD-10-CM

## 2025-04-22 PROCEDURE — 3008F BODY MASS INDEX DOCD: CPT | Performed by: OBSTETRICS & GYNECOLOGY

## 2025-04-22 PROCEDURE — 99024 POSTOP FOLLOW-UP VISIT: CPT | Performed by: OBSTETRICS & GYNECOLOGY

## 2025-04-22 RX ORDER — GABAPENTIN 600 MG/1
600 TABLET ORAL ONCE
OUTPATIENT
Start: 2025-04-22 | End: 2025-04-22

## 2025-04-22 RX ORDER — CELECOXIB 400 MG/1
400 CAPSULE ORAL ONCE
OUTPATIENT
Start: 2025-04-22 | End: 2025-04-22

## 2025-04-22 RX ORDER — ACETAMINOPHEN 325 MG/1
975 TABLET ORAL ONCE
OUTPATIENT
Start: 2025-04-22 | End: 2025-04-22

## 2025-04-22 ASSESSMENT — PAIN SCALES - GENERAL: PAINLEVEL_OUTOF10: 0-NO PAIN

## 2025-04-22 NOTE — PROGRESS NOTES
Assessment     PLAN  1. JEVON III with severe dysplasia (Primary), s/p LEEP  - extensive discussion today with patient and her partner regarding LEEP results. She has now had 2 LEEPs without dysplasia noted on pathology. Given that the endocervical curetting's have been abnormal it is likely that the cells are too high in the endocervical canal to get to with a LEEP/cone. I recommend a hysterectomy as a definitive treatment for the cervical dysplasia and she is agreeable/    We reviewed the plan today for definitive surgical management with total laparoscopic hysterectomy with ovarian conservation. We reviewed the risks of surgery which include bleeding, infection, injury to adjacent organs (bowel, bladder, and ureters), transfusion, thromboembolism, and/or the need for laparotomy to complete the procedure.     The risks/benefits of ovarian conservation were discussed at length. We also discussed the approximate 5-8% risk of a future surgery for subsequent ovarian pathology.     All of her questions about the procedure were addressed. Will proceed with scheduling.    3. Vaginal irritation  - treat based on results  - Vaginitis Gram Stain For Bacterial Vaginosis + Yeast      Kimberley Nichols MD      Subjective     Vicky Wills is a 44 y.o. female who is here for LEEP follow up   PCP = Mady Jonas, APRN-CNP    HPI:   - reports recovering well. Abnormal discharged stopped and now has noted increased discharge again the past few days.     PSH: LEEP x 2, laparoscopic cystectomy - dermoid cyst    PMH, PSH, FH, SH, medications and allergies reviewed and edited as needed.      Objective   /70   Ht 1.524 m (5')   Wt 87.1 kg (192 lb)   LMP 04/01/2025   BMI 37.50 kg/m²      General:   Alert and oriented, in no acute distress               Vulva: Normal architecture without erythema, masses, or lesions.    Vagina: Normal mucosa without lesions, masses, or atrophy. +thin yellow discharge   Cervix:  LEEP site well healed   Uterus: Normal mobile, non-enlarged uterus    Adnexa: Normal without masses or lesions

## 2025-04-23 LAB — BV SCORE VAG QL: ABNORMAL

## 2025-04-24 DIAGNOSIS — N76.0 BV (BACTERIAL VAGINOSIS): Primary | ICD-10-CM

## 2025-04-24 DIAGNOSIS — B96.89 BV (BACTERIAL VAGINOSIS): Primary | ICD-10-CM

## 2025-04-24 RX ORDER — METRONIDAZOLE 500 MG/1
500 TABLET ORAL 2 TIMES DAILY
Qty: 14 TABLET | Refills: 0 | Status: SHIPPED | OUTPATIENT
Start: 2025-04-24 | End: 2025-05-01

## 2025-04-28 ENCOUNTER — TELEPHONE (OUTPATIENT)
Dept: OBSTETRICS AND GYNECOLOGY | Facility: CLINIC | Age: 45
End: 2025-04-28
Payer: MEDICAID

## 2025-05-01 ENCOUNTER — HOSPITAL ENCOUNTER (OUTPATIENT)
Facility: HOSPITAL | Age: 45
Setting detail: OUTPATIENT SURGERY
End: 2025-05-01
Attending: OBSTETRICS & GYNECOLOGY | Admitting: OBSTETRICS & GYNECOLOGY
Payer: MEDICAID

## 2025-08-29 ENCOUNTER — APPOINTMENT (OUTPATIENT)
Dept: OBSTETRICS AND GYNECOLOGY | Facility: CLINIC | Age: 45
End: 2025-08-29
Payer: MEDICAID

## 2025-09-19 ENCOUNTER — APPOINTMENT (OUTPATIENT)
Dept: OBSTETRICS AND GYNECOLOGY | Facility: CLINIC | Age: 45
End: 2025-09-19
Payer: MEDICAID

## (undated) DEVICE — ELECTRODE, ELECTROSURGICAL, BALL TIP, LLETZ, LARGE, 5 MM X 13 CM, STAINLESS STEEL

## (undated) DEVICE — DRAPE, PAD, PREP, W/ 9 IN CUFF, 24 X 41, LF, NS

## (undated) DEVICE — COVER, TABLE, 44 X 75 IN, DISPOSABLE, LF, STERILE

## (undated) DEVICE — PREP TRAY, SKIN, DRY, W/GLOVES

## (undated) DEVICE — MANIFOLD, 4 PORT NEPTUNE STANDARD

## (undated) DEVICE — GOWN, SURGICAL, SMARTGOWN, XLARGE, STERILE

## (undated) DEVICE — SUTURE, VICRYL, 0, 27 IN, UR-6, VIOLET

## (undated) DEVICE — Device

## (undated) DEVICE — ELECTRODE, OPTI2 LAPAROSCOPIC SPATULA, CURVED

## (undated) DEVICE — APPLICATOR, COTTON TIP, 6 IN, 2PK, STERILE

## (undated) DEVICE — SUTURE, VICRYL, 4-0, 18 IN, UNDYED BR PS-2

## (undated) DEVICE — TUBING, VACUUM, LASER, 0.875 IN X 6 FT

## (undated) DEVICE — DRESSING, GAUZE, SPONGE, 8 PLY, CURITY, 2 X 2 IN, STERILE

## (undated) DEVICE — TUBE, SALEM SUMP, 16 FR X 48IN, ENFIT

## (undated) DEVICE — SYSTEM, FIOS FIRST ENTRY, 5 X 100MM, KII ADVANCED FIXATION

## (undated) DEVICE — DRESSING, TRANSPARENT, TEGADERM, 2-3/8 X 2-3/4 IN

## (undated) DEVICE — TOWEL, SURGICAL, NEURO, O/R, 16 X 26, BLUE, STERILE

## (undated) DEVICE — LIGASURE, SEALER/DIVIDER MARYLAND JAW, 5MM

## (undated) DEVICE — ELECTRODE, ELECTROSURGICAL, UTAHLOOP, CUTTING, SAFE-T-GAUGE, 11 CM SHAFT, 12 X 20 MM, LF

## (undated) DEVICE — REST, HEAD, BAGEL, 9 IN

## (undated) DEVICE — GOWN, ASTOUND, L

## (undated) DEVICE — APPLICATOR, CHLORAPREP, W/ORANGE TINT, 26ML

## (undated) DEVICE — COVER, LIGHT HANDLE, SURGICAL, FLEXIBLE, DISPOSABLE, STERILE

## (undated) DEVICE — MARKER, SKIN, RULER AND LABEL PACK, CUSTOM

## (undated) DEVICE — SPONGE, GAUZE, XRAY DECT, 16 PLY, 4 X 4, W/MASTER DMT,STERILE

## (undated) DEVICE — GLOVE, SURGICAL, PROTEXIS,  6.0, PF, LATEX

## (undated) DEVICE — ADHESIVE, SKIN, LIQUIBAND EXCEED

## (undated) DEVICE — CARE KIT, LAPAROSCOPIC, ADVANCED

## (undated) DEVICE — TROCAR SYSTEM, BALLOON, KII GELPORT, 12 X 100MM

## (undated) DEVICE — CAUTERY, PENCIL, PUSH BUTTON, SMOKE EVAC, 70MM

## (undated) DEVICE — PAD, GROUNDING, ELECTROSURGICAL, W/9 FT CABLE, POLYHESIVE II, ADULT, LF

## (undated) DEVICE — DRESSING, NON-ADHERENT, TELFA, OUCHLESS, 3 X 8 IN, STERILE

## (undated) DEVICE — GLOVE, SURGICAL, PROTEXIS,  6.5, PF, LATEX

## (undated) DEVICE — HOLSTER, JET SAFETY

## (undated) DEVICE — COVER, CART, 45 X 27 X 48 IN, CLEAR

## (undated) DEVICE — BAG, TISSUE RETRIEVAL, 10MM, ANCHOR

## (undated) DEVICE — TUBING, RAPIDVAC, SMOKE EVAC, 7/8 X 10

## (undated) DEVICE — COUNTER, NEEDLE, FOAM BLOCK, W/MAGNET, W/BLADE GUARD, 10 COUNT, RED, LF

## (undated) DEVICE — REDUCER, TUBING, W/SILICONE TUBE

## (undated) DEVICE — CLIPPER, SURGICAL BLADE ASSEMBLY, GENERAL PURPOSE, SINGLE USE